# Patient Record
Sex: FEMALE | Race: BLACK OR AFRICAN AMERICAN | NOT HISPANIC OR LATINO | URBAN - METROPOLITAN AREA
[De-identification: names, ages, dates, MRNs, and addresses within clinical notes are randomized per-mention and may not be internally consistent; named-entity substitution may affect disease eponyms.]

---

## 2024-06-30 ENCOUNTER — HOSPITAL ENCOUNTER (EMERGENCY)
Age: 59
Discharge: LEFT BEFORE DISCHARGE | End: 2024-07-01

## 2024-06-30 VITALS
RESPIRATION RATE: 18 BRPM | HEART RATE: 76 BPM | DIASTOLIC BLOOD PRESSURE: 105 MMHG | OXYGEN SATURATION: 99 % | SYSTOLIC BLOOD PRESSURE: 186 MMHG | TEMPERATURE: 98 F

## 2024-06-30 ASSESSMENT — PAIN SCALES - GENERAL: PAINLEVEL_OUTOF10: 8

## 2024-10-21 ENCOUNTER — APPOINTMENT (OUTPATIENT)
Dept: CV DIAGNOSTICS | Facility: HOSPITAL | Age: 59
End: 2024-10-21
Attending: HOSPITALIST

## 2024-10-21 ENCOUNTER — APPOINTMENT (OUTPATIENT)
Dept: GENERAL RADIOLOGY | Facility: HOSPITAL | Age: 59
End: 2024-10-21
Attending: EMERGENCY MEDICINE

## 2024-10-21 ENCOUNTER — HOSPITAL ENCOUNTER (OUTPATIENT)
Facility: HOSPITAL | Age: 59
Setting detail: OBSERVATION
Discharge: HOME | End: 2024-10-23
Attending: EMERGENCY MEDICINE | Admitting: HOSPITALIST

## 2024-10-21 DIAGNOSIS — F41.9 ANXIETY: ICD-10-CM

## 2024-10-21 DIAGNOSIS — R07.9 CHEST PAIN, RULE OUT ACUTE MYOCARDIAL INFARCTION: Primary | ICD-10-CM

## 2024-10-21 PROBLEM — E78.00 PURE HYPERCHOLESTEROLEMIA: Chronic | Status: ACTIVE | Noted: 2024-10-21

## 2024-10-21 PROBLEM — I50.33 ACUTE ON CHRONIC HEART FAILURE WITH PRESERVED EJECTION FRACTION (HCC): Status: ACTIVE | Noted: 2024-10-21

## 2024-10-21 PROBLEM — I10 PRIMARY HYPERTENSION: Chronic | Status: ACTIVE | Noted: 2024-10-21

## 2024-10-21 PROBLEM — N18.6 ESRD ON DIALYSIS (HCC): Chronic | Status: ACTIVE | Noted: 2024-10-21

## 2024-10-21 PROBLEM — J81.0 ACUTE PULMONARY EDEMA (HCC): Status: ACTIVE | Noted: 2024-10-21

## 2024-10-21 PROBLEM — Z99.2 ESRD ON DIALYSIS (HCC): Chronic | Status: ACTIVE | Noted: 2024-10-21

## 2024-10-21 PROBLEM — N18.6 ESRD (END STAGE RENAL DISEASE) (HCC): Status: ACTIVE | Noted: 2024-10-21

## 2024-10-21 LAB
ALBUMIN SERPL-MCNC: 4 G/DL (ref 3.2–4.8)
ALBUMIN/GLOB SERPL: 1.1 {RATIO} (ref 1–2)
ALP LIVER SERPL-CCNC: 183 U/L
ALT SERPL-CCNC: <7 U/L
ANION GAP SERPL CALC-SCNC: 7 MMOL/L (ref 0–18)
AST SERPL-CCNC: 24 U/L (ref ?–34)
ATRIAL RATE: 90 BPM
BASOPHILS # BLD AUTO: 0.02 X10(3) UL (ref 0–0.2)
BASOPHILS NFR BLD AUTO: 0.4 %
BILIRUB SERPL-MCNC: 0.2 MG/DL (ref 0.3–1.2)
BUN BLD-MCNC: 27 MG/DL (ref 9–23)
CALCIUM BLD-MCNC: 10.2 MG/DL (ref 8.7–10.4)
CHLORIDE SERPL-SCNC: 98 MMOL/L (ref 98–112)
CHOLEST SERPL-MCNC: 153 MG/DL (ref ?–200)
CO2 SERPL-SCNC: 31 MMOL/L (ref 21–32)
CREAT BLD-MCNC: 4.56 MG/DL
EGFRCR SERPLBLD CKD-EPI 2021: 11 ML/MIN/1.73M2 (ref 60–?)
EOSINOPHIL # BLD AUTO: 0.14 X10(3) UL (ref 0–0.7)
EOSINOPHIL NFR BLD AUTO: 2.5 %
ERYTHROCYTE [DISTWIDTH] IN BLOOD BY AUTOMATED COUNT: 16.3 %
GLOBULIN PLAS-MCNC: 3.6 G/DL (ref 2–3.5)
GLUCOSE BLD-MCNC: 159 MG/DL (ref 70–99)
HBV SURFACE AB SER QL: REACTIVE
HBV SURFACE AB SERPL IA-ACNC: >1000 MIU/ML
HBV SURFACE AG SER-ACNC: <0.1 [IU]/L
HBV SURFACE AG SERPL QL IA: NONREACTIVE
HCT VFR BLD AUTO: 33 %
HDLC SERPL-MCNC: 48 MG/DL (ref 40–59)
HGB BLD-MCNC: 10.4 G/DL
IMM GRANULOCYTES # BLD AUTO: 0.03 X10(3) UL (ref 0–1)
IMM GRANULOCYTES NFR BLD: 0.5 %
LDLC SERPL CALC-MCNC: 90 MG/DL (ref ?–100)
LYMPHOCYTES # BLD AUTO: 0.64 X10(3) UL (ref 1–4)
LYMPHOCYTES NFR BLD AUTO: 11.3 %
MCH RBC QN AUTO: 28.1 PG (ref 26–34)
MCHC RBC AUTO-ENTMCNC: 31.5 G/DL (ref 31–37)
MCV RBC AUTO: 89.2 FL
MONOCYTES # BLD AUTO: 0.5 X10(3) UL (ref 0.1–1)
MONOCYTES NFR BLD AUTO: 8.9 %
NEUTROPHILS # BLD AUTO: 4.31 X10 (3) UL (ref 1.5–7.7)
NEUTROPHILS # BLD AUTO: 4.31 X10(3) UL (ref 1.5–7.7)
NEUTROPHILS NFR BLD AUTO: 76.4 %
NONHDLC SERPL-MCNC: 105 MG/DL (ref ?–130)
OSMOLALITY SERPL CALC.SUM OF ELEC: 290 MOSM/KG (ref 275–295)
P AXIS: 23 DEGREES
P-R INTERVAL: 174 MS
PLATELET # BLD AUTO: 226 10(3)UL (ref 150–450)
POTASSIUM SERPL-SCNC: 3.8 MMOL/L (ref 3.5–5.1)
PROT SERPL-MCNC: 7.6 G/DL (ref 5.7–8.2)
Q-T INTERVAL: 402 MS
QRS DURATION: 70 MS
QTC CALCULATION (BEZET): 491 MS
R AXIS: 15 DEGREES
RBC # BLD AUTO: 3.7 X10(6)UL
SODIUM SERPL-SCNC: 136 MMOL/L (ref 136–145)
T AXIS: 28 DEGREES
TRIGL SERPL-MCNC: 75 MG/DL (ref 30–149)
TROPONIN I SERPL HS-MCNC: 32 NG/L
TROPONIN I SERPL HS-MCNC: 41 NG/L
TROPONIN I SERPL HS-MCNC: 42 NG/L
TROPONIN I SERPL HS-MCNC: 43 NG/L
VENTRICULAR RATE: 90 BPM
VLDLC SERPL CALC-MCNC: 12 MG/DL (ref 0–30)
WBC # BLD AUTO: 5.6 X10(3) UL (ref 4–11)

## 2024-10-21 PROCEDURE — 71045 X-RAY EXAM CHEST 1 VIEW: CPT | Performed by: EMERGENCY MEDICINE

## 2024-10-21 PROCEDURE — 99223 1ST HOSP IP/OBS HIGH 75: CPT | Performed by: HOSPITALIST

## 2024-10-21 PROCEDURE — 99223 1ST HOSP IP/OBS HIGH 75: CPT | Performed by: INTERNAL MEDICINE

## 2024-10-21 PROCEDURE — 93306 TTE W/DOPPLER COMPLETE: CPT | Performed by: HOSPITALIST

## 2024-10-21 RX ORDER — SEVELAMER CARBONATE 800 MG/1
800 TABLET, FILM COATED ORAL
COMMUNITY

## 2024-10-21 RX ORDER — DOCUSATE SODIUM 100 MG/1
100 CAPSULE, LIQUID FILLED ORAL 2 TIMES DAILY PRN
COMMUNITY

## 2024-10-21 RX ORDER — POLYETHYLENE GLYCOL 3350 17 G/17G
17 POWDER, FOR SOLUTION ORAL DAILY PRN
Status: DISCONTINUED | OUTPATIENT
Start: 2024-10-21 | End: 2024-10-23

## 2024-10-21 RX ORDER — HYDRALAZINE HYDROCHLORIDE 50 MG/1
50 TABLET, FILM COATED ORAL DAILY
Status: ON HOLD | COMMUNITY
End: 2024-10-21

## 2024-10-21 RX ORDER — ONDANSETRON 2 MG/ML
4 INJECTION INTRAMUSCULAR; INTRAVENOUS EVERY 6 HOURS PRN
Status: DISCONTINUED | OUTPATIENT
Start: 2024-10-21 | End: 2024-10-23

## 2024-10-21 RX ORDER — NIFEDIPINE 30 MG/1
60 TABLET, EXTENDED RELEASE ORAL 2 TIMES DAILY
Status: DISCONTINUED | OUTPATIENT
Start: 2024-10-21 | End: 2024-10-23

## 2024-10-21 RX ORDER — HYDRALAZINE HYDROCHLORIDE 50 MG/1
50 TABLET, FILM COATED ORAL DAILY
Status: DISCONTINUED | OUTPATIENT
Start: 2024-10-21 | End: 2024-10-21

## 2024-10-21 RX ORDER — SODIUM PHOSPHATE, DIBASIC AND SODIUM PHOSPHATE, MONOBASIC 7; 19 G/230ML; G/230ML
1 ENEMA RECTAL ONCE AS NEEDED
Status: DISCONTINUED | OUTPATIENT
Start: 2024-10-21 | End: 2024-10-23

## 2024-10-21 RX ORDER — METHOCARBAMOL 750 MG/1
750 TABLET, FILM COATED ORAL 3 TIMES DAILY
COMMUNITY

## 2024-10-21 RX ORDER — LOSARTAN POTASSIUM 50 MG/1
50 TABLET ORAL DAILY
Status: DISCONTINUED | OUTPATIENT
Start: 2024-10-21 | End: 2024-10-23

## 2024-10-21 RX ORDER — METHOCARBAMOL 750 MG/1
750 TABLET, FILM COATED ORAL 3 TIMES DAILY
Status: DISCONTINUED | OUTPATIENT
Start: 2024-10-21 | End: 2024-10-23

## 2024-10-21 RX ORDER — CARVEDILOL 25 MG/1
25 TABLET ORAL 2 TIMES DAILY WITH MEALS
COMMUNITY

## 2024-10-21 RX ORDER — PANTOPRAZOLE SODIUM 40 MG/1
40 TABLET, DELAYED RELEASE ORAL
COMMUNITY

## 2024-10-21 RX ORDER — PROCHLORPERAZINE EDISYLATE 5 MG/ML
5 INJECTION INTRAMUSCULAR; INTRAVENOUS EVERY 8 HOURS PRN
Status: DISCONTINUED | OUTPATIENT
Start: 2024-10-21 | End: 2024-10-23

## 2024-10-21 RX ORDER — FOLIC ACID 1 MG/1
1 TABLET ORAL DAILY
COMMUNITY

## 2024-10-21 RX ORDER — SENNOSIDES 8.6 MG
17.2 TABLET ORAL NIGHTLY PRN
Status: DISCONTINUED | OUTPATIENT
Start: 2024-10-21 | End: 2024-10-23

## 2024-10-21 RX ORDER — BISACODYL 10 MG
10 SUPPOSITORY, RECTAL RECTAL
Status: DISCONTINUED | OUTPATIENT
Start: 2024-10-21 | End: 2024-10-23

## 2024-10-21 RX ORDER — NITROGLYCERIN 0.4 MG/1
0.4 TABLET SUBLINGUAL ONCE
Status: COMPLETED | OUTPATIENT
Start: 2024-10-21 | End: 2024-10-21

## 2024-10-21 RX ORDER — ATORVASTATIN CALCIUM 40 MG/1
40 TABLET, FILM COATED ORAL NIGHTLY
Status: ON HOLD | COMMUNITY
End: 2024-10-22

## 2024-10-21 RX ORDER — CARVEDILOL 12.5 MG/1
25 TABLET ORAL 2 TIMES DAILY WITH MEALS
Status: DISCONTINUED | OUTPATIENT
Start: 2024-10-21 | End: 2024-10-23

## 2024-10-21 RX ORDER — CINACALCET 30 MG/1
30 TABLET, FILM COATED ORAL
Status: DISCONTINUED | OUTPATIENT
Start: 2024-10-21 | End: 2024-10-23

## 2024-10-21 RX ORDER — ASPIRIN 81 MG/1
81 TABLET ORAL DAILY
Status: DISCONTINUED | OUTPATIENT
Start: 2024-10-21 | End: 2024-10-23

## 2024-10-21 RX ORDER — ENOXAPARIN SODIUM 100 MG/ML
40 INJECTION SUBCUTANEOUS DAILY
Status: DISCONTINUED | OUTPATIENT
Start: 2024-10-21 | End: 2024-10-21

## 2024-10-21 RX ORDER — ACETAMINOPHEN 500 MG
500 TABLET ORAL EVERY 4 HOURS PRN
Status: DISCONTINUED | OUTPATIENT
Start: 2024-10-21 | End: 2024-10-23

## 2024-10-21 RX ORDER — VITAMIN B COMPLEX
CAPSULE ORAL
COMMUNITY

## 2024-10-21 RX ORDER — FUROSEMIDE 10 MG/ML
40 INJECTION INTRAMUSCULAR; INTRAVENOUS ONCE
Status: COMPLETED | OUTPATIENT
Start: 2024-10-21 | End: 2024-10-21

## 2024-10-21 RX ORDER — PANTOPRAZOLE SODIUM 40 MG/1
40 TABLET, DELAYED RELEASE ORAL
Status: DISCONTINUED | OUTPATIENT
Start: 2024-10-21 | End: 2024-10-23

## 2024-10-21 RX ORDER — ATORVASTATIN CALCIUM 40 MG/1
40 TABLET, FILM COATED ORAL NIGHTLY
Status: DISCONTINUED | OUTPATIENT
Start: 2024-10-21 | End: 2024-10-23

## 2024-10-21 RX ORDER — MELATONIN
3 NIGHTLY PRN
Status: DISCONTINUED | OUTPATIENT
Start: 2024-10-21 | End: 2024-10-23

## 2024-10-21 RX ORDER — ASPIRIN 81 MG/1
81 TABLET ORAL DAILY
COMMUNITY

## 2024-10-21 RX ORDER — HEPARIN SODIUM 5000 [USP'U]/ML
5000 INJECTION, SOLUTION INTRAVENOUS; SUBCUTANEOUS EVERY 8 HOURS SCHEDULED
Status: DISCONTINUED | OUTPATIENT
Start: 2024-10-21 | End: 2024-10-23

## 2024-10-21 RX ORDER — SEVELAMER CARBONATE 800 MG/1
1600 TABLET, FILM COATED ORAL
Status: DISCONTINUED | OUTPATIENT
Start: 2024-10-21 | End: 2024-10-23

## 2024-10-21 RX ORDER — ALPRAZOLAM 0.25 MG/1
0.25 TABLET ORAL ONCE
Status: COMPLETED | OUTPATIENT
Start: 2024-10-21 | End: 2024-10-21

## 2024-10-21 RX ORDER — SENNOSIDES A AND B 8.6 MG/1
2 TABLET, FILM COATED ORAL AS NEEDED
COMMUNITY

## 2024-10-21 RX ORDER — ALPRAZOLAM 0.25 MG/1
0.25 TABLET ORAL 3 TIMES DAILY PRN
Status: DISCONTINUED | OUTPATIENT
Start: 2024-10-21 | End: 2024-10-23

## 2024-10-21 RX ORDER — LOSARTAN POTASSIUM 50 MG/1
50 TABLET ORAL DAILY
COMMUNITY

## 2024-10-21 RX ORDER — CINACALCET 30 MG/1
30 TABLET, FILM COATED ORAL
COMMUNITY

## 2024-10-21 RX ORDER — ZINC OXIDE 20 %
OINTMENT (GRAM) TOPICAL AS NEEDED
COMMUNITY

## 2024-10-21 NOTE — DISCHARGE INSTRUCTIONS
General Medical Follow up:  [facility] (healthcare clinic) [email or web address]  [facility] welcomes patients with Medicaid, Medicare, private insurance or no insurance at all, possibly at a discounted rate. [facility] offers low cost prescriptions drugs when seen by a [facility] physician.   [facility] [address], [place] [id number] [phone]  [facility] [address], [place] [address] [phone]  [facility] [address] [place], [place] [id number] [phone]  [facility] [address], [address] [place] [address]  Ela colby kimberlee aqui o llame al teléfono [phone] o al [phone].     [facility] provides care for chronic disease management, and offers coordinated, patient-centered care.  Their services include adult health, pediatric health, women's health, dental services, behavioral health services and LGBTQ+ health.  [facility] works to eliminate any barriers that may keep our patients from accessing services. We do this by providing 24-hour access to providers, comprehensive care management, transportation assistance, access to reduced-price pharmaceuticals, on-site laboratories, same and next-day appointments, bilingual staff and translation services.    [facility]- [address]- [address], [address], [facility]- [address] [facility] [facility]- [address] [facility], [address], [facility]- [address] [facility]- [address] [facility]- [address], [facility]- [address], [facility] [address], [place], [place]     [email or web address]  Main number [phone]    Visit [employer] for discounted  prescription drug coupons or [employer] for $4 prescriptions [email or web address]    To inquire about [place], call [facility] [phone] or visit [email or web address]    Healthcare.gov - Marketplace insurance      Community Health Care Program (only apply if you do not Qualify for medicaid)  [facility]  [address]; [address]  [place]  Phone: [phone]  [email or web address]    [place] [address] [address]  [phone]  [email or web address]    Questions about financial assistance, application, or uninsured discounts can be directed to [phone]      Going Home    In this section you will find the tools which will guide you through the first few days after you leave the hospital. Continued use of these tools will help you develop the skills necessary to keep your heart failure under control.     Heart Failure Guidelines - place this worksheet on your refrigerator or somewhere you can refer to it daily to help you decide if your symptoms are under control, and what to do if they are not.     Home Care Instructions Following Heart Failure - the most important things to do every day include:     Weigh yourself  Take your medicines as prescribed  Limit your sodium (salt) and fluid intake  Know when to call your cardiologist, primary doctor, or nurse  Know when to seek emergency care    There is also a handy weight chart on which to record your weight every day, information on measuring fluids and limiting fluid intake, and a section for recording your thoughts or questions.     Things for You to Remember:   1. An appointment has been made for you to see your doctor or healthcare provider within 7 days of hospital discharge. It is important that you attend this appointment to make sure your symptoms are under control.     2. Your recommended sodium intake is 3909-5281 mg  daily    3. Limit your fluid intake to no more than 2 liters or 64 ounces per day    4. Some exercise and activity is important to help keep your heart functioning and strong. Unless instructed not to exercise, you may walk at a slow to moderate pace for 10-15 minutes 2-3 days per week to start. Pace your activity to prevent shortness of breath or fatigue. Stop exercise if you develop chest pain, lightheadedness, or significant shortness of breath.       Call Your Cardiologist If:   You gain 2 pounds overnight or 3-4 pounds in 3-5 days  You have more difficulty breathing  You are getting more tired with normal activity  You are more short of breath lying down, or awaken at night short of breath  You have swelling of your feet or legs  You urinate less often during the day and more often at night  You have cramps in your legs  You have blurred vision or see yellowish-green halos around objects of lights    Go to the Emergency Room If:   You have pain or tightness in your chest  You are extremely short of breath  You are coughing up pink-frothy mucus  You are traveling and develop symptoms of worsening heart failure    Additional Resources:   If you have questions or concerns about heart failure management, call the [facility] Heart Failure Unit at [phone]

## 2024-10-21 NOTE — CM/SW NOTE
Pt listed as self pay. Resources on AVS for self pay. GLM also made aware to screen pt. Pt goes to outpatient HD at  [facility]. Msg left w/ facility to see if they have ins info on pt.    Lizbeth FREEMAN, LSW  Discharge Planner

## 2024-10-21 NOTE — CM/SW NOTE
Spoke with [facility]. Stated that they have [facility]. Policy ID # [id number].  updated GLM.     Lizbeth FREEMAN, LSW  Discharge Planner

## 2024-10-21 NOTE — H&P
[facility]     Merlin George Patient Status:  Emergency    10/27/1965 MRN [id number]   Location [facility] EMERGENCY DEPARTMENT Attending Jesse Santos MD   Hosp Day # 0 PCP No primary care provider on file.     Chief Complaint: Chest pain    Subjective:    History of Present Illness:     Merlin George is a 58 year old female with hx of ESRD on HD T, , Sat had dialysis this past Saturday and after the dialysis started having some chest pain they felt she was having of panic attack gave her some Benadryl she says the Benadryl did not help she continues to have chest pain and shortness of breath.  Patient does states she feels anxious.  No nausea, vomiting.    History/Other:    Past Medical History:  Past Medical History:    CKD (chronic kidney disease)    Diabetes (HCC)    Dialysis patient (HCC)    Essential hypertension    Hyperlipidemia     Past Surgical History:   Past Surgical History:   Procedure Laterality Date    Excis lumbar disk,one level      Removal gallbladder        Family History:   History reviewed. No pertinent family history.  Social History:         Allergies: Allergies[1]    Medications:  Medications Ordered Prior to Encounter[2]    Review of Systems:   A comprehensive review of systems was completed.    Pertinent positives and negatives noted in the HPI.    Objective:   Physical Exam:    /78   Pulse 87   Temp 99.1 °F (37.3 °C) (Temporal)   Resp 18   Ht 5' 3\" (1.6 m)   Wt 120 lb (54.4 kg)   SpO2 100%   BMI 21.26 kg/m²   General: No acute distress, Alert  Respiratory: No rhonchi, no wheezes  Cardiovascular: S1, S2. Regular rate and rhythm  Abdomen: Soft, Non-tender, non-distended, positive bowel sounds  Neuro: No new focal deficits  Extremities: No edema      Results:    Labs:      Labs Last 24 Hours:    Recent Labs   Lab 10/21/24  0330   RBC 3.70*   HGB 10.4*   HCT 33.0*   MCV 89.2   MCH 28.1   MCHC 31.5   RDW 16.3   NEPRELIM 4.31   WBC 5.6    .0       No results for input(s): \"GLU\", \"BUN\", \"CREATSERUM\", \"GFRAA\", \"GFRNAA\", \"EGFRCR\", \"CA\", \"ALB\", \"NA\", \"K\", \"CL\", \"CO2\", \"ALKPHO\", \"AST\", \"ALT\", \"BILT\", \"TP\" in the last 168 hours.    No results found for: \"PT\", \"INR\"    No results for input(s): \"TROP\", \"TROPHS\", \"CK\" in the last 168 hours.    No results for input(s): \"TROP\", \"PBNP\" in the last 168 hours.    No results for input(s): \"PCT\" in the last 168 hours.    Imaging: Imaging data reviewed in Epic.    Assessment & Plan:      #Chest pain  - Will rule out ACS with serial troponins  - Echo in the am  - Cardiology on consult    # ESRD on HD  - Will consult nephrology for dialysis    # Hypertension  - Will continue on coreg, hydralazine, nifedepine and losartan.    # HFpEF  - Echo in 2023 show EF of 55% with grade 1 diastolic dysfunction.    # Hyperlipidemia  - Will continue on statin therapy.    # GERD  - Will continue on PPI.      Plan of care discussed with patient at bedside.    Khari Lyle, DO    Supplementary Documentation:     The 21st Century Cures Act makes medical notes like these available to patients in the interest of transparency. Please be advised this is a medical document. Medical documents are intended to carry relevant information, facts as evident, and the clinical opinion of the practitioner. The medical note is intended as peer to peer communication and may appear blunt or direct. It is written in medical language and may contain abbreviations or verbiage that are unfamiliar.                                     [1]   Allergies  Allergen Reactions    Codeine HALLUCINATION    Dilaudid [Hydromorphone] ANXIETY    Tramadol HALLUCINATION    Ventolin [Albuterol] ANXIETY   [2]   No current facility-administered medications on file prior to encounter.     Current Outpatient Medications on File Prior to Encounter   Medication Sig Dispense Refill    carvedilol 25 MG Oral Tab Take 1 tablet (25 mg total) by mouth 2 (two) times  daily with meals.      cinacalcet 30 MG Oral Tab Take 1 tablet (30 mg total) by mouth daily with breakfast.      NIFEdipine ER 60 MG Oral Tablet 24 Hr Take 1 tablet (60 mg total) by mouth in the morning and 1 tablet (60 mg total) before bedtime.      hydrALAZINE 50 MG Oral Tab Take 1 tablet (50 mg total) by mouth daily.      Methylnaltrexone Bromide 150 MG Oral Tab Take 150 mg by mouth daily.      pantoprazole 40 MG Oral Tab EC Take 1 tablet (40 mg total) by mouth every morning before breakfast.      linaCLOtide 145 MCG Oral Cap Take 145 mcg by mouth daily.      methocarbamol 750 MG Oral Tab Take 1 tablet (750 mg total) by mouth 3 (three) times daily.      sennosides 8.6 MG Oral Tab Take 2 tablets (17.2 mg total) by mouth as needed for constipation.      ceFEPIme 100 mg/mL in sodium chloride 10 mL (1 g total) by IV Push route one time. During Dialyis, T, TH, Saturday      folic acid 1 MG Oral Tab Take 1 tablet (1 mg total) by mouth daily.      aspirin 81 MG Oral Tab EC Take 1 tablet (81 mg total) by mouth daily.      sevelamer carbonate (RENVELA) 800 MG Oral Tab Take 1 tablet (800 mg total) by mouth 3 (three) times daily with meals. 2 tabs      atorvastatin 40 MG Oral Tab Take 1 tablet (40 mg total) by mouth nightly.      losartan 50 MG Oral Tab Take 1 tablet (50 mg total) by mouth daily.      docusate sodium 100 MG Oral Cap Take 1 capsule (100 mg total) by mouth 2 (two) times daily as needed for constipation.      zinc oxide 20 % External Ointment Apply topically as needed for Dry Skin.      B Complex Vitamins (VITAMIN B COMPLEX) Oral Cap Take by mouth.

## 2024-10-21 NOTE — PROGRESS NOTES
10/21/24 0525 10/21/24 0530   Vital Signs   Temp 99 °F (37.2 °C)  --    Temp src Axillary  --    Pulse 93 96   Heart Rate Source Monitor Monitor   Cardiac Rhythm NSR NSR   Resp 19 19   Respiratory Quality Normal Normal   /76 148/78   MAP (mmHg) 97 100   BP Location Right arm Right arm   BP Method Automatic Automatic   Patient Position Lying Sitting   Oxygen Therapy   SpO2 94 % 96 %   O2 Device Nasal cannula Nasal cannula   O2 Flow Rate (L/min) 3 L/min 3 L/min   Pulse Oximetry Type Continuous Continuous   Oximetry Probe Site Changed No No

## 2024-10-21 NOTE — PLAN OF CARE
Assumed care of patient around 0730.Patient AXOX4.On 3L /NC.NSR on tele.Patient c/o shortness of breath,patient stated she had HD on Saturday  and was able to pull out only 1L due to panic attack.Notified primary,nephrology consult ordered,stat HD ordered.Xanax given.Call light within reach.Plan of care updated.  Problem: Diabetes/Glucose Control  Goal: Glucose maintained within prescribed range  Description: INTERVENTIONS:  - Monitor Blood Glucose as ordered  - Assess for signs and symptoms of hyperglycemia and hypoglycemia  - Administer ordered medications to maintain glucose within target range  - Assess barriers to adequate nutritional intake and initiate nutrition consult as needed  - Instruct patient on self management of diabetes  10/21/2024 1207 by Nicanor De La Fuente RN  Outcome: Progressing  10/21/2024 1207 by Nicanor De La Fuente RN  Outcome: Progressing     Problem: Patient/Family Goals  Goal: Patient/Family Long Term Goal  Description: Patient's Long Term Goal: stay healthy    Interventions:  - HD  -Medication management  -Dietary management  -  - See additional Care Plan goals for specific interventions  10/21/2024 1207 by Nicanor De La Fuente RN  Outcome: Progressing  10/21/2024 1207 by Nicanor De La Fuente, MEGHANA  Outcome: Progressing  Goal: Patient/Family Short Term Goal  Description: Patient's Short Term Goal: discharge home    Interventions:   - HD today  -Anti anxiety medication  -Emotional support  -Wean O2  - See additional Care Plan goals for specific interventions  10/21/2024 1207 by Nicanor De La Fuente, MEGHANA  Outcome: Progressing  10/21/2024 1207 by Nicanor De La Fuente, MEGHANA  Outcome: Progressing     Problem: PAIN - ADULT  Goal: Verbalizes/displays adequate comfort level or patient's stated pain goal  Description: INTERVENTIONS:  - Encourage pt to monitor pain and request assistance  - Assess pain using appropriate pain scale  - Administer analgesics based on type and severity of pain and evaluate response  - Implement non-pharmacological  measures as appropriate and evaluate response  - Consider cultural and social influences on pain and pain management  - Manage/alleviate anxiety  - Utilize distraction and/or relaxation techniques  - Monitor for opioid side effects  - Notify MD/LIP if interventions unsuccessful or patient reports new pain  - Anticipate increased pain with activity and pre-medicate as appropriate  10/21/2024 1207 by Nicanor De La Fuente, RN  Outcome: Progressing  10/21/2024 1207 by Nicanor De La Fuente, RN  Outcome: Progressing     Problem: RISK FOR INFECTION - ADULT  Goal: Absence of fever/infection during anticipated neutropenic period  Description: INTERVENTIONS  - Monitor WBC  - Administer growth factors as ordered  - Implement neutropenic guidelines  10/21/2024 1207 by Nicanor De La Fuente, RN  Outcome: Progressing  10/21/2024 1207 by Nicanor De La Fuente, RN  Outcome: Progressing

## 2024-10-21 NOTE — ED PROVIDER NOTES
Patient Seen in: [facility] Emergency Department      History     Chief Complaint   Patient presents with   • Chest Pain Angina     Stated Complaint: chest pain    Subjective:   HPI      58-year-old female presenting with chest pain EMS were called because patient was awakened with retrosternal chest pain and some shortness of breath prompting visit here aspirin nitroglycerin were given patient symptoms have since improved patient denies any sort of chest pain recent cough cold fevers chills or any other exacerbating leaving factors or associated symptoms.  She is a dialysis patient was dialysis patient receives dialysis Tuesday Thursday and Saturday.  No recent cough cold no recent salty meals    Objective:     Past Medical History:   • CKD (chronic kidney disease)   • Diabetes (HCC)   • Dialysis patient (HCC)   • Essential hypertension   • Hyperlipidemia              Past Surgical History:   Procedure Laterality Date   • Excis lumbar disk,one level     • Removal gallbladder                  No pertinent social history.                Physical Exam     ED Triage Vitals [10/21/24 0325]   /78   Pulse 87   Resp 18   Temp 99.1 °F (37.3 °C)   Temp src Temporal   SpO2 100 %   O2 Device None (Room air)       Current Vitals:   Vital Signs  BP: 133/78  Pulse: 87  Resp: 18  Temp: 99.1 °F (37.3 °C)  Temp src: Temporal  MAP (mmHg): 93    Oxygen Therapy  SpO2: 100 %  O2 Device: None (Room air)        Physical Exam  Awake alert patient appears no distress HEENT exam is normal lungs are clear cardiovascular exam regular rhythm abdomen soft nontender extremities no Cyanosis or edema    ED Course     Labs Reviewed   COMP METABOLIC PANEL (14) - Abnormal; Notable for the following components:       Result Value    Glucose 159 (*)     BUN 27 (*)     Creatinine 4.56 (*)     eGFR-Cr 11 (*)     ALT <7 (*)     Alkaline Phosphatase 183 (*)     Bilirubin, Total 0.2 (*)     Globulin  3.6 (*)     All other components within  normal limits   CBC WITH DIFFERENTIAL WITH PLATELET - Abnormal; Notable for the following components:    RBC 3.70 (*)     HGB 10.4 (*)     HCT 33.0 (*)     Lymphocyte Absolute 0.64 (*)     All other components within normal limits   TROPONIN I HIGH SENSITIVITY - Abnormal; Notable for the following components:    Troponin I (High Sensitivity) 42 (*)     All other components within normal limits   LIPID PANEL - Normal   RAINBOW DRAW LAVENDER   RAINBOW DRAW LIGHT GREEN   RAINBOW DRAW BLUE     EKG    Rate, intervals and axes as noted on EKG Report.  Rate: 90  Rhythm: Sinus Rhythm  Reading: Prolonged QT noted                Differential diagnosis includes STEMI, NSTEMI, ACS       MDM          Admission disposition: 10/21/2024  4:47 AM           Medical Decision Making  58-year-old female presenting to the emergency department for chest pain IV established cardiac monitor shows a sinus rhythm pulse ox shows no signs of hypoxia.  CBC shows stable hemoglobin level metabolic panel is elevated renal function which is consistent with her renal disease troponin level shows a minimal elevation which could be due to her renal disease as well independent interpretation by ED physician chest x-ray shows pulmonary edema Lasix nitroglycerin ordered here emerged department symptoms have continued to improve she has no chest pain at this time she will be admitted to the hospitalist with cardiac consultation    Problems Addressed:  Chest pain, rule out acute myocardial infarction: acute illness or injury    Amount and/or Complexity of Data Reviewed  Labs: ordered. Decision-making details documented in ED Course.  Radiology: ordered and independent interpretation performed. Decision-making details documented in ED Course.  ECG/medicine tests: ordered and independent interpretation performed. Decision-making details documented in ED Course.    Risk  Decision regarding hospitalization.        Disposition and Plan     Clinical  Impression:  1. Chest pain, rule out acute myocardial infarction         Disposition:  Admit  10/21/2024  4:47 am    Follow-up:  No follow-up provider specified.        Medications Prescribed:  Current Discharge Medication List              Supplementary Documentation:         Hospital Problems       Present on Admission  Date Reviewed: 10/21/2024            ICD-10-CM Noted POA    * (Principal) Chest pain, rule out acute myocardial infarction R07.9 10/21/2024 Unknown

## 2024-10-21 NOTE — PLAN OF CARE
Patient alert and oriented x 3. On 3L o2 support. Sinus on cardiac monitor. Patient denies any chest pain or chest discomfort at this time.no urin output, creatinine elevated, hemo dialysis patient  ,TTS ,LT arm Fistula,last HD able to remove one litter only due to panic, patient  now very discomfort/ sob , need dialysis, last BM 10/20. . Plan of care updated, all questions answered. Safety precautions in place. Bed alarm on. Will continue to monitor tele/labs/vital signs closely.   Skin assessment done with staff adele, only sacrum excoriated old healing marks, skin care provided .  Plan:   ECHO , TROPONIN IN AM, DW     Problem: Diabetes/Glucose Control  Goal: Glucose maintained within prescribed range  Description: INTERVENTIONS:  - Monitor Blood Glucose as ordered  - Assess for signs and symptoms of hyperglycemia and hypoglycemia  - Administer ordered medications to maintain glucose within target range  - Assess barriers to adequate nutritional intake and initiate nutrition consult as needed  - Instruct patient on self management of diabetes  Outcome: Progressing     Problem: PAIN - ADULT  Goal: Verbalizes/displays adequate comfort level or patient's stated pain goal  Description: INTERVENTIONS:  - Encourage pt to monitor pain and request assistance  - Assess pain using appropriate pain scale  - Administer analgesics based on type and severity of pain and evaluate response  - Implement non-pharmacological measures as appropriate and evaluate response  - Consider cultural and social influences on pain and pain management  - Manage/alleviate anxiety  - Utilize distraction and/or relaxation techniques  - Monitor for opioid side effects  - Notify MD/LIP if interventions unsuccessful or patient reports new pain  - Anticipate increased pain with activity and pre-medicate as appropriate  Outcome: Progressing     Problem: RISK FOR INFECTION - ADULT  Goal: Absence of fever/infection during anticipated neutropenic  period  Description: INTERVENTIONS  - Monitor WBC  - Administer growth factors as ordered  - Implement neutropenic guidelines  Outcome: Progressing     Problem: SAFETY ADULT - FALL  Goal: Free from fall injury  Description: INTERVENTIONS:  - Assess pt frequently for physical needs  - Identify cognitive and physical deficits and behaviors that affect risk of falls.  - [place] fall precautions as indicated by assessment.  - Educate pt/family on patient safety including physical limitations  - Instruct pt to call for assistance with activity based on assessment  - Modify environment to reduce risk of injury  - Provide assistive devices as appropriate  - Consider OT/PT consult to assist with strengthening/mobility  - Encourage toileting schedule  Outcome: Progressing     Problem: DISCHARGE PLANNING  Goal: Discharge to home or other facility with appropriate resources  Description: INTERVENTIONS:  - Identify barriers to discharge w/pt and caregiver  - Include patient/family/discharge partner in discharge planning  - Arrange for needed discharge resources and transportation as appropriate  - Identify discharge learning needs (meds, wound care, etc)  - Arrange for interpreters to assist at discharge as needed  - Consider post-discharge preferences of patient/family/discharge partner  - Complete POLST form as appropriate  - Assess patient's ability to be responsible for managing their own health  - Refer to Case Management Department for coordinating discharge planning if the patient needs post-hospital services based on physician/LIP order or complex needs related to functional status, cognitive ability or social support system  Outcome: Progressing     Problem: SKIN/TISSUE INTEGRITY - ADULT  Goal: Skin integrity remains intact  Description: INTERVENTIONS  - Assess and document risk factors for pressure ulcer development  - Assess and document skin integrity  - Monitor for areas of redness and/or skin breakdown  -  Initiate interventions, skin care algorithm/standards of care as needed  Outcome: Progressing     Problem: METABOLIC/FLUID AND ELECTROLYTES - ADULT  Goal: Glucose maintained within prescribed range  Description: INTERVENTIONS:  - Monitor Blood Glucose as ordered  - Assess for signs and symptoms of hyperglycemia and hypoglycemia  - Administer ordered medications to maintain glucose within target range  - Assess barriers to adequate nutritional intake and initiate nutrition consult as needed  - Instruct patient on self management of diabetes  Outcome: Progressing     Problem: GASTROINTESTINAL - ADULT  Goal: Maintains or returns to baseline bowel function  Description: INTERVENTIONS:  - Assess bowel function  - Maintain adequate hydration with IV or PO as ordered and tolerated  - Evaluate effectiveness of GI medications  - Encourage mobilization and activity  - Obtain nutritional consult as needed  - Establish a toileting routine/schedule  - Consider collaborating with pharmacy to review patient's medication profile  Outcome: Progressing     Problem: GASTROINTESTINAL - ADULT  Goal: Minimal or absence of nausea and vomiting  Description: INTERVENTIONS:  - Maintain adequate hydration with IV or PO as ordered and tolerated  - Nasogastric tube to low intermittent suction as ordered  - Evaluate effectiveness of ordered antiemetic medications  - Provide nonpharmacologic comfort measures as appropriate  - Advance diet as tolerated, if ordered  - Obtain nutritional consult as needed  - Evaluate fluid balance  Outcome: Progressing

## 2024-10-21 NOTE — CONSULTS
[facility]  Report of Consultation    Merlin George Patient Status:  Observation    10/27/1965 MRN [id number]   Location [facility] 8NE-A Attending Alberto Anglin MD   Hosp Day # 0 PCP None Pcp       Assessment / Plan:    1) ESRD- due to longstanding HTN / DM; on HD x 6 yrs at  [facility] T[facility]. Lytes OK but volume up with hypoxia, crackles, pulm edema. PLAN- HD today    2) Recent L paraspinal abscess s/p drainage  (Serr[place]ia -> cefepime x 8 weeks)    3) C4 burst fracture with cord compression- 2 stage surgery recommended by ortho but delayed due to #2    4) Thoracic disc herniation s/p T9-12 laminectomy, etc     5) DM 2    6) Anemia- due to CKD / above; continue EPO with HD    7) HTN- continue usual coreg / nifedipine / FERRER / losartan       Reason for Consultation:  ESRD    History of Present Illness:  Merlin George is a a(n) 58 year old female.  Very pleasant 58-year-old female with a history of end-stage renal disease on dialysis for about 6 years ago longstanding diabetes and hypertension last had dialysis on Saturday which was cut short because of a panic attack and anxiety now presents with progressive shortness of breath and hypoxia and found to have pulmonary edema.  Denies any fevers chills cough nausea vomiting diarrhea or other unusual symptoms.  Has been compliant with dialysis.  Normally does not use home O2.  Never smoked no history of COPD.    History:  Past Medical History:    CKD (chronic kidney disease)    Diabetes (HCC)    Dialysis patient (HCC)    Essential hypertension    Hyperlipidemia     Past Surgical History:   Procedure Laterality Date    Excis lumbar disk,one level      Removal gallbladder       History reviewed. No pertinent family history.  Denies family history of kidney disease.    reports that she has never smoked. She has never used smokeless tobacco.    Allergies:  Allergies[1]    Medications:    Current Facility-Administered Medications:      aspirin DR tab 81 mg, 81 mg, Oral, Daily    atorvastatin (Lipitor) tab 40 mg, 40 mg, Oral, Nightly    carvedilol (Coreg) tab 25 mg, 25 mg, Oral, BID with meals    cinacalcet (Sensipar) tab 30 mg, 30 mg, Oral, Daily with breakfast    hydrALAZINE (Apresoline) tab 50 mg, 50 mg, Oral, Daily    linaCLOtide CAPS 145 mcg, 145 mcg, Oral, Daily    losartan (Cozaar) tab 50 mg, 50 mg, Oral, Daily    methocarbamol (Robaxin) tab 750 mg, 750 mg, Oral, TID    NIFEdipine ER (Procardia-XL) 24 hr tab 60 mg, 60 mg, Oral, BID    pantoprazole (Protonix) DR tab 40 mg, 40 mg, Oral, QAM AC    sevelamer carbonate (Renvela) tab 1,600 mg, 1,600 mg, Oral, TID CC    melatonin tab 3 mg, 3 mg, Oral, Nightly PRN    acetaminophen (Tylenol Extra Strength) tab 500 mg, 500 mg, Oral, Q4H PRN    polyethylene glycol (PEG 3350) (Miralax) 17 g oral packet 17 g, 17 g, Oral, Daily PRN    sennosides (Senokot) tab 17.2 mg, 17.2 mg, Oral, Nightly PRN    bisacodyl (Dulcolax) 10 MG rectal suppository 10 mg, 10 mg, Rectal, Daily PRN    fleet enema (Fleet) rectal enema 133 mL, 1 enema, Rectal, Once PRN    [Held by provider] ondansetron (Zofran) 4 MG/2ML injection 4 mg, 4 mg, Intravenous, Q6H PRN    prochlorperazine (Compazine) 10 MG/2ML injection 5 mg, 5 mg, Intravenous, Q8H PRN    heparin (Porcine) 5000 UNIT/ML injection 5,000 Units, 5,000 Units, Subcutaneous, Q8H DAVID    ALPRAZolam (Xanax) tab 0.25 mg, 0.25 mg, Oral, Once    ALPRAZolam (Xanax) tab 0.25 mg, 0.25 mg, Oral, TID PRN  No current outpatient medications on file.       Review of Systems:  Please see HPI for pertinent positives. 10 point review of systems otherwise reviewed and negative.     Physical Exam:  /81 (BP Location: Right arm)   Pulse 106   Temp 98 °F (36.7 °C) (Oral)   Resp 20   Ht 5' 3\" (1.6 m)   Wt 116 lb 12.8 oz (53 kg)   SpO2 97%   BMI 20.69 kg/m²   Temp (24hrs), Av.7 °F (37.1 °C), Min:98 °F (36.7 °C), Max:99.1 °F (37.3 °C)     No intake or output data in the 24 hours ending  10/21/24 0934  Wt Readings from Last 3 Encounters:   10/21/24 116 lb 12.8 oz (53 kg)     General: awake alert  HEENT: No scleral icterus, MMM  Neck: Supple, no MICAH or thyromegaly  Cardiac: Regular rate and rhythm, S1, S2 normal, no murmur, rub, or gallop  Lungs: Decreased breath sounds at the bases bilaterally.   Abdomen: Soft, non-tender. + bowel sounds, no palpable organomegaly  Extremities: Without clubbing, cyanosis; no edema  Neurologic: Cranial nerves grossly intact, moving all extremities  Skin: Warm and dry, no rashes      Laboratory Data:  Lab Results   Component Value Date    WBC 5.6 10/21/2024    HGB 10.4 10/21/2024    HCT 33.0 10/21/2024    .0 10/21/2024    CREATSERUM 4.56 10/21/2024    BUN 27 10/21/2024     10/21/2024    K 3.8 10/21/2024    CL 98 10/21/2024    CO2 31.0 10/21/2024     10/21/2024    CA 10.2 10/21/2024    ALB 4.0 10/21/2024    ALKPHO 183 10/21/2024    BILT 0.2 10/21/2024    TP 7.6 10/21/2024    AST 24 10/21/2024    ALT <7 10/21/2024       Imaging:  All imaging studies reviewed.      Thank you for allowing me to participate in the care of your patient.    David Wright MD  10/21/2024  9:34 AM         [1]   Allergies  Allergen Reactions    Codeine HALLUCINATION    Dilaudid [Hydromorphone] ANXIETY    Tramadol HALLUCINATION    Ventolin [Albuterol] ANXIETY

## 2024-10-21 NOTE — CONSULTS
[facility] - CARDIOLOGY CONSULT NOTE    Merlin George Patient Status:  Observation    10/27/1965 MRN [id number]   Location [facility] 8NE-A Attending Alberto Anglin MD   Hosp Day # 0 PCP None Pcp     Date of Admission:  10/21/2024  Date of Consult:  10/21/2024  I was asked by Alberto Anglin MD to provide recommendations for evaluation and management of cardiac issues.    Reason for Consultation:     Chest pain, SOB    History of Present Illness:   Patient is a 58 year old female with history of ESRD on hemodialysis on , , , hypertension, hyperlipidemia, HFpEF, recent spine surgery complicated by hardware infection on course of antibiotics currently, who presents with chest pain and shortness of breath.  She was at dialysis on Saturday she started to feel like she was having a panic attack, her dialysis session was cut short and only took 1 L of fluid off.  She has had on and off chest pain, which is now resolved, however she feels like she cannot breathe and her throat is closing on itself.    Results:     Lab Results   Component Value Date    WBC 5.6 10/21/2024    HGB 10.4 (L) 10/21/2024    HCT 33.0 (L) 10/21/2024    .0 10/21/2024    CREATSERUM 4.56 (H) 10/21/2024    BUN 27 (H) 10/21/2024     10/21/2024    K 3.8 10/21/2024    CL 98 10/21/2024    CO2 31.0 10/21/2024     (H) 10/21/2024    CA 10.2 10/21/2024    ALB 4.0 10/21/2024    ALKPHO 183 (H) 10/21/2024    BILT 0.2 (L) 10/21/2024    TP 7.6 10/21/2024    AST 24 10/21/2024    ALT <7 (L) 10/21/2024       XR CHEST AP PORTABLE  (CPT=71045)    Result Date: 10/21/2024  CONCLUSION:  Cardiomegaly with pulmonary vascular congestion, increased interstitial markings in the lungs, and patchy ground-glass opacity is suggestive of congestive failure with pulmonary edema.  Clinical correlation recommended.   A preliminary report was provided by the [facility] teleradiology service.  LOCATION:  [facility]      Dictated by  (CST): Chapo Alexandra MD on 10/21/2024 at 7:07 AM     Finalized by (CST): Chapo Alexandra MD on 10/21/2024 at 7:07 AM      EKG 12 Lead    Result Date: 10/21/2024  Normal sinus rhythm Prolonged QT interval or tu fusion, consider myocardial disease, electrolyte imbalance, or drug effects Abnormal ECG No previous ECGs found in [facility] Confirmed by NEFTALI TAVERAS JORDAN ([id number]) on 10/21/2024 7:52:59 AM     Past Medical History  Past Medical History:    CKD (chronic kidney disease)    Diabetes (HCC)    Dialysis patient (HCC)    Essential hypertension    Hyperlipidemia       Past Surgical History  Past Surgical History:   Procedure Laterality Date    Excis lumbar disk,one level      Removal gallbladder         Family History  History reviewed. No pertinent family history.    Social History  Pediatric History   Patient Parents    Not on file     Other Topics Concern    Not on file   Social History Narrative    Not on file           Current Medications:  Current Facility-Administered Medications   Medication Dose Route Frequency    aspirin DR tab 81 mg  81 mg Oral Daily    atorvastatin (Lipitor) tab 40 mg  40 mg Oral Nightly    carvedilol (Coreg) tab 25 mg  25 mg Oral BID with meals    cinacalcet (Sensipar) tab 30 mg  30 mg Oral Daily with breakfast    hydrALAZINE (Apresoline) tab 50 mg  50 mg Oral Daily    linaCLOtide CAPS 145 mcg  145 mcg Oral Daily    losartan (Cozaar) tab 50 mg  50 mg Oral Daily    methocarbamol (Robaxin) tab 750 mg  750 mg Oral TID    NIFEdipine ER (Procardia-XL) 24 hr tab 60 mg  60 mg Oral BID    pantoprazole (Protonix) DR tab 40 mg  40 mg Oral QAM AC    sevelamer carbonate (Renvela) tab 1,600 mg  1,600 mg Oral TID CC    melatonin tab 3 mg  3 mg Oral Nightly PRN    acetaminophen (Tylenol Extra Strength) tab 500 mg  500 mg Oral Q4H PRN    polyethylene glycol (PEG 3350) (Miralax) 17 g oral packet 17 g  17 g Oral Daily PRN    sennosides (Senokot) tab 17.2 mg  17.2 mg Oral Nightly PRN    bisacodyl  (Dulcolax) 10 MG rectal suppository 10 mg  10 mg Rectal Daily PRN    fleet enema (Fleet) rectal enema 133 mL  1 enema Rectal Once PRN    ondansetron (Zofran) 4 MG/2ML injection 4 mg  4 mg Intravenous Q6H PRN    prochlorperazine (Compazine) 10 MG/2ML injection 5 mg  5 mg Intravenous Q8H PRN    heparin (Porcine) 5000 UNIT/ML injection 5,000 Units  5,000 Units Subcutaneous Q8H DAVID    ALPRAZolam (Xanax) tab 0.25 mg  0.25 mg Oral Once     Medications Prior to Admission   Medication Sig    carvedilol 25 MG Oral Tab Take 1 tablet (25 mg total) by mouth 2 (two) times daily with meals.    cinacalcet 30 MG Oral Tab Take 1 tablet (30 mg total) by mouth daily with breakfast.    NIFEdipine ER 60 MG Oral Tablet 24 Hr Take 1 tablet (60 mg total) by mouth in the morning and 1 tablet (60 mg total) before bedtime.    hydrALAZINE 50 MG Oral Tab Take 1 tablet (50 mg total) by mouth daily.    Methylnaltrexone Bromide 150 MG Oral Tab Take 150 mg by mouth daily.    pantoprazole 40 MG Oral Tab EC Take 1 tablet (40 mg total) by mouth every morning before breakfast.    linaCLOtide 145 MCG Oral Cap Take 145 mcg by mouth daily.    methocarbamol 750 MG Oral Tab Take 1 tablet (750 mg total) by mouth 3 (three) times daily.    sennosides 8.6 MG Oral Tab Take 2 tablets (17.2 mg total) by mouth as needed for constipation.    ceFEPIme 100 mg/mL in sodium chloride 10 mL (1 g total) by IV Push route one time. During Dialyis, T, TH, Saturday    folic acid 1 MG Oral Tab Take 1 tablet (1 mg total) by mouth daily.    aspirin 81 MG Oral Tab EC Take 1 tablet (81 mg total) by mouth daily.    sevelamer carbonate (RENVELA) 800 MG Oral Tab Take 1 tablet (800 mg total) by mouth 3 (three) times daily with meals. 2 tabs    atorvastatin 40 MG Oral Tab Take 1 tablet (40 mg total) by mouth nightly.    losartan 50 MG Oral Tab Take 1 tablet (50 mg total) by mouth daily.    docusate sodium 100 MG Oral Cap Take 1 capsule (100 mg total) by mouth 2 (two) times daily as  needed for constipation.    zinc oxide 20 % External Ointment Apply topically as needed for Dry Skin.    B Complex Vitamins (VITAMIN B COMPLEX) Oral Cap Take by mouth.       Allergies  Allergies[1]    Review of Systems:   10 pt ROS performed, separate from HPI  Review of Systems:  GENERAL: no fevers, chills, sweats  HEENT: no visual or hearing changes  SKIN: denies any unusual skin lesions or rashes  RESPIRATORY: denies shortness of breath with exertion  CARDIOVASCULAR: no active chest pain, no claudication  GI: denies abdominal pain and denies heartburn  : no dysuria or hematuria  NEURO: denies headaches, focal weaknesses or paresthesias  All other systems were reviewed are negative  Physical Exam:   Blood pressure 155/81, pulse 106, temperature 98 °F (36.7 °C), temperature source Oral, resp. rate 20, height 63\", weight 116 lb 12.8 oz (53 kg), SpO2 97%.    Scheduled Meds:    aspirin  81 mg Oral Daily    atorvastatin  40 mg Oral Nightly    carvedilol  25 mg Oral BID with meals    cinacalcet  30 mg Oral Daily with breakfast    hydrALAZINE  50 mg Oral Daily    linaCLOtide  145 mcg Oral Daily    losartan  50 mg Oral Daily    methocarbamol  750 mg Oral TID    NIFEdipine ER  60 mg Oral BID    pantoprazole  40 mg Oral QAM AC    sevelamer carbonate  1,600 mg Oral TID CC    heparin  5,000 Units Subcutaneous Q8H DAVID    ALPRAZolam  0.25 mg Oral Once         Physical Exam:    General: Alert and oriented. No apparent distress. No respiratory or constitutional distress.  HEENT: Normocephalic, anicteric sclera, neck supple  Neck: Positive JVD, carotids 2+, supple  Cardiac: Regular rate. No pathologic murmur.  Lungs: Crackles at the bases, coarse breath sounds throughout  Abdomen: Soft, non-tender. BS-present.  Extremities: Without clubbing, cyanosis.  Peripheral pulsespresent.  Neurologic: Alert and oriented, normal affect. Motor ok.  Skin: Warm and dry.     Imaging: I independently visualized all relevant chest imaging in  PACS, agree with radiology interpretation except where noted.  Thank you for allowing me to participate in the care of your patient.    Labs:  HEM:  Recent Labs   Lab 10/21/24  0330   WBC 5.6   HGB 10.4*   .0       Chem:  Recent Labs   Lab 10/21/24  0331      K 3.8   CL 98   CO2 31.0   BUN 27*   CREATSERUM 4.56*   CA 10.2   *       Recent Labs   Lab 10/21/24  0331   ALT <7*   AST 24   ALB 4.0       No results for input(s): \"TROP\", \"CK\" in the last 168 hours.    No results for input(s): \"PTP\", \"INR\" in the last 168 hours.    Impression:   1.  Shortness of breath-suspect related to volume overload, as she has JVD and pulmonary vascular congestion on her x-ray, and was not able to complete dialysis on Saturday.  Check echo.  2.  Chest pain-unclear etiology however she has multiple risk factors, however currently she is chest pain-free.  Her initial Trop was mildly elevated however this may be secondary to volume overload in the setting of her ESRD.  Check another troponin, will likely need ischemic evaluation prior to discharge.  Continue aspirin and high intensity statin  3.  Troponin elevation-likely type II, however given multiple risk factors plan for ischemic evaluation prior to discharge  4.  ESRD on dialysis-appreciate nephrology recommendations  5.  Hypertension-continue current medicines    Recommendations:  Check echocardiogram  Nephrology evaluation for further dialysis  Continue aspirin and statin  Check troponin now  Ischemic evaluation prior to discharge    C5    Jhonny Blake MD  [facility]  10/21/2024         [1]   Allergies  Allergen Reactions    Codeine HALLUCINATION    Dilaudid [Hydromorphone] ANXIETY    Tramadol HALLUCINATION    Ventolin [Albuterol] ANXIETY

## 2024-10-21 NOTE — PROGRESS NOTES
Patient seen and examined   Chest: CTA B/L  CVS: S1, S2, RRR  ABD: Soft, NT, ND, BS+  EXT: No c/c    No further CP  Cardio consulted will f/u   Renal consulted for urgent HD  Nebs, anti-emetics ans feels nauseated and dizzy. BP stable  Also give xanax for anxiety/panic issues     Alberto Anglin MD

## 2024-10-21 NOTE — PROGRESS NOTES
[facility] Pharmacy Dosing Service  Antibiotic Dosing    Merlin George is a 58 year old for whom pharmacy is dosing cefepime for treatment of  Recent L paraspinal abscess s/p drainage 9/13-outside hospital. .  Other antibiotics (Not dosed by pharmacy): none    Allergies: is allergic to codeine, dilaudid [hydromorphone], tramadol, and ventolin [albuterol].    Vitals: BP (!) 172/93 (BP Location: Right arm)   Pulse 99   Temp 98.6 °F (37 °C) (Oral)   Resp 21   Ht 1.6 m (5' 3\")   Wt 53 kg (116 lb 12.8 oz)   SpO2 95%   BMI 20.69 kg/m²     Labs:   WBC   Date Value Ref Range Status   10/21/2024 5.6 4.0 - 11.0 x10(3) uL Final     Creatinine   Date Value Ref Range Status   10/21/2024 4.56 (H) 0.55 - 1.02 mg/dL Final     BUN   Date Value Ref Range Status   10/21/2024 27 (H) 9 - 23 mg/dL Final       CrCl: estimated creatinine clearance is 11.1 mL/min (A) (based on SCr of 4.56 mg/dL (H)).    Assessment/Plan: per protocol will give cefepime 1 gram iv every 24 hours; Dose due every 24 hours. On dialysis days give dose after HD to prevent drug elimination during HD. All other days, give as scheduled     Pharmacy will continue to follow.  We appreciate the opportunity to assist in the care of this patient.    Thank you,   Saida Hall PharmD  10/21/2024  2:43 PM

## 2024-10-22 LAB
ANION GAP SERPL CALC-SCNC: 10 MMOL/L (ref 0–18)
BASOPHILS # BLD AUTO: 0.05 X10(3) UL (ref 0–0.2)
BASOPHILS NFR BLD AUTO: 1.2 %
BUN BLD-MCNC: 15 MG/DL (ref 9–23)
C DIFF TOX B STL QL: NEGATIVE
CALCIUM BLD-MCNC: 10.3 MG/DL (ref 8.7–10.4)
CHLORIDE SERPL-SCNC: 101 MMOL/L (ref 98–112)
CO2 SERPL-SCNC: 26 MMOL/L (ref 21–32)
CREAT BLD-MCNC: 3.63 MG/DL
EGFRCR SERPLBLD CKD-EPI 2021: 14 ML/MIN/1.73M2 (ref 60–?)
EOSINOPHIL # BLD AUTO: 0.15 X10(3) UL (ref 0–0.7)
EOSINOPHIL NFR BLD AUTO: 3.5 %
ERYTHROCYTE [DISTWIDTH] IN BLOOD BY AUTOMATED COUNT: 16.2 %
GLUCOSE BLD-MCNC: 113 MG/DL (ref 70–99)
GLUCOSE BLD-MCNC: 99 MG/DL (ref 70–99)
HCT VFR BLD AUTO: 31.2 %
HGB BLD-MCNC: 9.5 G/DL
IMM GRANULOCYTES # BLD AUTO: 0.01 X10(3) UL (ref 0–1)
IMM GRANULOCYTES NFR BLD: 0.2 %
LYMPHOCYTES # BLD AUTO: 0.61 X10(3) UL (ref 1–4)
LYMPHOCYTES NFR BLD AUTO: 14.4 %
MCH RBC QN AUTO: 27.7 PG (ref 26–34)
MCHC RBC AUTO-ENTMCNC: 30.4 G/DL (ref 31–37)
MCV RBC AUTO: 91 FL
MONOCYTES # BLD AUTO: 0.39 X10(3) UL (ref 0.1–1)
MONOCYTES NFR BLD AUTO: 9.2 %
NEUTROPHILS # BLD AUTO: 3.03 X10 (3) UL (ref 1.5–7.7)
NEUTROPHILS # BLD AUTO: 3.03 X10(3) UL (ref 1.5–7.7)
NEUTROPHILS NFR BLD AUTO: 71.5 %
OSMOLALITY SERPL CALC.SUM OF ELEC: 285 MOSM/KG (ref 275–295)
PLATELET # BLD AUTO: 207 10(3)UL (ref 150–450)
POTASSIUM SERPL-SCNC: 4 MMOL/L (ref 3.5–5.1)
RBC # BLD AUTO: 3.43 X10(6)UL
SODIUM SERPL-SCNC: 137 MMOL/L (ref 136–145)
WBC # BLD AUTO: 4.2 X10(3) UL (ref 4–11)

## 2024-10-22 PROCEDURE — 99233 SBSQ HOSP IP/OBS HIGH 50: CPT | Performed by: HOSPITALIST

## 2024-10-22 PROCEDURE — 99233 SBSQ HOSP IP/OBS HIGH 50: CPT | Performed by: INTERNAL MEDICINE

## 2024-10-22 RX ORDER — ALPRAZOLAM 0.25 MG/1
0.25 TABLET ORAL ONCE
Status: COMPLETED | OUTPATIENT
Start: 2024-10-22 | End: 2024-10-22

## 2024-10-22 RX ORDER — ATORVASTATIN CALCIUM 80 MG/1
80 TABLET, FILM COATED ORAL NIGHTLY
Qty: 30 TABLET | Refills: 3 | Status: SHIPPED | OUTPATIENT
Start: 2024-10-22

## 2024-10-22 NOTE — PROGRESS NOTES
[facility]  Nephrology Progress Note    Merlin George Attending:  Alberto Anglin MD       Assessment and Plan:    1) ESRD- due to longstanding HTN / DM; on HD x 6 yrs at  [facility] TTS. Lytes OK but volume up with hypoxia, crackles, pulm edema s/p extra HD 10/21. PLAN- HD today per usual routine     2) Recent L paraspinal abscess s/p drainage  ([place] -> cefepime x 8 weeks)     3) C4 burst fracture with cord compression- 2 stage surgery recommended by ortho but delayed due to #2     4) Thoracic disc herniation s/p T9-12 laminectomy, etc      5) DM 2     6) Anemia- due to CKD / above; continue EPO with HD     7) HTN- continue usual coreg / nifedipine / FERRER / losartan     Consider dc home today after HD      Subjective:  Awake alert no c/o feeling better on 3 L NC O2    Physical Exam:   /77 (BP Location: Right arm)   Pulse 84   Temp 97.9 °F (36.6 °C) (Oral)   Resp 19   Ht 5' 3\" (1.6 m)   Wt 111 lb 1.8 oz (50.4 kg)   SpO2 98%   BMI 19.68 kg/m²   Temp (24hrs), Av.3 °F (36.8 °C), Min:97.9 °F (36.6 °C), Max:98.7 °F (37.1 °C)       Intake/Output Summary (Last 24 hours) at 10/22/2024 0953  Last data filed at 10/21/2024 2014  Gross per 24 hour   Intake 200 ml   Output 2500 ml   Net -2300 ml     Wt Readings from Last 3 Encounters:   10/22/24 111 lb 1.8 oz (50.4 kg)     General: awake alert  HEENT: No scleral icterus, MMM  Neck: Supple, no MICAH or thyromegaly  Cardiac: Regular rate and rhythm, S1, S2 normal, no murmur or tub  Lungs: Decreased BS at bases bilaterally   Abdomen: Soft, non-tender. + bowel sounds, no palpable organomegaly  Extremities: Without clubbing, cyanosis; no edema  Neurologic: Cranial nerves grossly intact, moving all extremities  Skin: Warm and dry, no rashes       Labs:   Lab Results   Component Value Date    WBC 4.2 10/22/2024    HGB 9.5 10/22/2024    HCT 31.2 10/22/2024    .0 10/22/2024    CREATSERUM 3.63 10/22/2024    BUN 15 10/22/2024      10/22/2024    K 4.0 10/22/2024     10/22/2024    CO2 26.0 10/22/2024    GLU 99 10/22/2024    CA 10.3 10/22/2024       Imaging:  All imaging studies reviewed.    Meds:   Current Facility-Administered Medications   Medication Dose Route Frequency    aspirin DR tab 81 mg  81 mg Oral Daily    atorvastatin (Lipitor) tab 40 mg  40 mg Oral Nightly    carvedilol (Coreg) tab 25 mg  25 mg Oral BID with meals    cinacalcet (Sensipar) tab 30 mg  30 mg Oral Daily with breakfast    linaCLOtide CAPS 145 mcg  145 mcg Oral Daily    losartan (Cozaar) tab 50 mg  50 mg Oral Daily    methocarbamol (Robaxin) tab 750 mg  750 mg Oral TID    NIFEdipine ER (Procardia-XL) 24 hr tab 60 mg  60 mg Oral BID    pantoprazole (Protonix) DR tab 40 mg  40 mg Oral QAM AC    sevelamer carbonate (Renvela) tab 1,600 mg  1,600 mg Oral TID CC    melatonin tab 3 mg  3 mg Oral Nightly PRN    acetaminophen (Tylenol Extra Strength) tab 500 mg  500 mg Oral Q4H PRN    polyethylene glycol (PEG 3350) (Miralax) 17 g oral packet 17 g  17 g Oral Daily PRN    sennosides (Senokot) tab 17.2 mg  17.2 mg Oral Nightly PRN    bisacodyl (Dulcolax) 10 MG rectal suppository 10 mg  10 mg Rectal Daily PRN    fleet enema (Fleet) rectal enema 133 mL  1 enema Rectal Once PRN    [Held by provider] ondansetron (Zofran) 4 MG/2ML injection 4 mg  4 mg Intravenous Q6H PRN    prochlorperazine (Compazine) 10 MG/2ML injection 5 mg  5 mg Intravenous Q8H PRN    heparin (Porcine) 5000 UNIT/ML injection 5,000 Units  5,000 Units Subcutaneous Q8H DAVID    ALPRAZolam (Xanax) tab 0.25 mg  0.25 mg Oral TID PRN    ipratropium (Atrovent HFA) 17 MCG/ACT inhaler 2 puff  2 puff Inhalation QID    ceFEPIme (Maxipime) 1 g in sodium chloride 0.9% 100 mL IVPB-MBP  1 g Intravenous Q24H         Questions/concerns were discussed with patient and/or family by bedside.          David Wright MD  10/22/2024  9:53 AM

## 2024-10-22 NOTE — PLAN OF CARE
Patient alert and oriented x 4. On 3L oxygen support , weaned to 2L O2 Support ,sinus on cardiac monitor. Patient denies any chest pain or chest discomfort at this time. Patient anuric /HD , last BM 10/20. Admitted with SOB, Oxygen weaning ,now comfortable .scds applied, LT arm fistula site intact ,Cefepime 1 gm iv q24hrs  on dialysis day getting ,no acute distress noted, made comfortable, mepilex changed bottom, Plan of care updated, all questions answered. Safety precautions in place. Bed alarm on. Will continue to monitor tele/labs/vital signs closely.     Plan:   Daily weight, strict I&o ,pain  mgt, next dialysis order    Problem: Diabetes/Glucose Control  Goal: Glucose maintained within prescribed range  Description: INTERVENTIONS:  - Monitor Blood Glucose as ordered  - Assess for signs and symptoms of hyperglycemia and hypoglycemia  - Administer ordered medications to maintain glucose within target range  - Assess barriers to adequate nutritional intake and initiate nutrition consult as needed  - Instruct patient on self management of diabetes  Outcome: Progressing     Problem: Patient/Family Goals  Goal: Patient/Family Long Term Goal  Description: Patient's Long Term Goal: stay healthy    Interventions:  - HD  -Medication management  -Dietary management  -  - See additional Care Plan goals for specific interventions  Outcome: Progressing  Goal: Patient/Family Short Term Goal  Description: Patient's Short Term Goal: discharge home    Interventions:   - HD today  -Anti anxiety medication  -Emotional support  -Wean O2  - See additional Care Plan goals for specific interventions  Outcome: Progressing     Problem: PAIN - ADULT  Goal: Verbalizes/displays adequate comfort level or patient's stated pain goal  Description: INTERVENTIONS:  - Encourage pt to monitor pain and request assistance  - Assess pain using appropriate pain scale  - Administer analgesics based on type and severity of pain and evaluate response  -  Implement non-pharmacological measures as appropriate and evaluate response  - Consider cultural and social influences on pain and pain management  - Manage/alleviate anxiety  - Utilize distraction and/or relaxation techniques  - Monitor for opioid side effects  - Notify MD/LIP if interventions unsuccessful or patient reports new pain  - Anticipate increased pain with activity and pre-medicate as appropriate  Outcome: Progressing     Problem: RISK FOR INFECTION - ADULT  Goal: Absence of fever/infection during anticipated neutropenic period  Description: INTERVENTIONS  - Monitor WBC  - Administer growth factors as ordered  - Implement neutropenic guidelines  Outcome: Progressing     Problem: SAFETY ADULT - FALL  Goal: Free from fall injury  Description: INTERVENTIONS:  - Assess pt frequently for physical needs  - Identify cognitive and physical deficits and behaviors that affect risk of falls.  - [place] fall precautions as indicated by assessment.  - Educate pt/family on patient safety including physical limitations  - Instruct pt to call for assistance with activity based on assessment  - Modify environment to reduce risk of injury  - Provide assistive devices as appropriate  - Consider OT/PT consult to assist with strengthening/mobility  - Encourage toileting schedule  Outcome: Progressing     Problem: DISCHARGE PLANNING  Goal: Discharge to home or other facility with appropriate resources  Description: INTERVENTIONS:  - Identify barriers to discharge w/pt and caregiver  - Include patient/family/discharge partner in discharge planning  - Arrange for needed discharge resources and transportation as appropriate  - Identify discharge learning needs (meds, wound care, etc)  - Arrange for interpreters to assist at discharge as needed  - Consider post-discharge preferences of patient/family/discharge partner  - Complete POLST form as appropriate  - Assess patient's ability to be responsible for managing their own  health  - Refer to Case Management Department for coordinating discharge planning if the patient needs post-hospital services based on physician/LIP order or complex needs related to functional status, cognitive ability or social support system  Outcome: Progressing     Problem: CARDIOVASCULAR - ADULT  Goal: Maintains optimal cardiac output and hemodynamic stability  Description: INTERVENTIONS:  - Monitor vital signs, rhythm, and trends  - Monitor for bleeding, hypotension and signs of decreased cardiac output  - Evaluate effectiveness of vasoactive medications to optimize hemodynamic stability  - Monitor arterial and/or venous puncture sites for bleeding and/or hematoma  - Assess quality of pulses, skin color and temperature  - Assess for signs of decreased coronary artery perfusion - ex. Angina  - Evaluate fluid balance, assess for edema, trend weights  Outcome: Progressing  Goal: Absence of cardiac arrhythmias or at baseline  Description: INTERVENTIONS:  - Continuous cardiac monitoring, monitor vital signs, obtain 12 lead EKG if indicated  - Evaluate effectiveness of antiarrhythmic and heart rate control medications as ordered  - Initiate emergency measures for life threatening arrhythmias  - Monitor electrolytes and administer replacement therapy as ordered  Outcome: Progressing     Problem: RESPIRATORY - ADULT  Goal: Achieves optimal ventilation and oxygenation  Description: INTERVENTIONS:  - Assess for changes in respiratory status  - Assess for changes in mentation and behavior  - Position to facilitate oxygenation and minimize respiratory effort  - Oxygen supplementation based on oxygen saturation or ABGs  - Provide Smoking Cessation handout, if applicable  - Encourage broncho-pulmonary hygiene including cough, deep breathe, Incentive Spirometry  - Assess the need for suctioning and perform as needed  - Assess and instruct to report SOB or any respiratory difficulty  - Respiratory Therapy support as  indicated  - Manage/alleviate anxiety  - Monitor for signs/symptoms of CO2 retention  Outcome: Progressing     Problem: GASTROINTESTINAL - ADULT  Goal: Minimal or absence of nausea and vomiting  Description: INTERVENTIONS:  - Maintain adequate hydration with IV or PO as ordered and tolerated  - Nasogastric tube to low intermittent suction as ordered  - Evaluate effectiveness of ordered antiemetic medications  - Provide nonpharmacologic comfort measures as appropriate  - Advance diet as tolerated, if ordered  - Obtain nutritional consult as needed  - Evaluate fluid balance  Outcome: Progressing  Goal: Maintains or returns to baseline bowel function  Description: INTERVENTIONS:  - Assess bowel function  - Maintain adequate hydration with IV or PO as ordered and tolerated  - Evaluate effectiveness of GI medications  - Encourage mobilization and activity  - Obtain nutritional consult as needed  - Establish a toileting routine/schedule  - Consider collaborating with pharmacy to review patient's medication profile  Outcome: Progressing  Goal: Maintains adequate nutritional intake (undernourished)  Description: INTERVENTIONS:  - Monitor percentage of each meal consumed  - Identify factors contributing to decreased intake, treat as appropriate  - Assist with meals as needed  - Monitor I&O, WT and lab values  - Obtain nutritional consult as needed  - Optimize oral hygiene and moisture  - Encourage food from home; allow for food preferences  - Enhance eating environment  Outcome: Progressing     Problem: METABOLIC/FLUID AND ELECTROLYTES - ADULT  Goal: Glucose maintained within prescribed range  Description: INTERVENTIONS:  - Monitor Blood Glucose as ordered  - Assess for signs and symptoms of hyperglycemia and hypoglycemia  - Administer ordered medications to maintain glucose within target range  - Assess barriers to adequate nutritional intake and initiate nutrition consult as needed  - Instruct patient on self management of  diabetes  Outcome: Progressing     Problem: SKIN/TISSUE INTEGRITY - ADULT  Goal: Skin integrity remains intact  Description: INTERVENTIONS  - Assess and document risk factors for pressure ulcer development  - Assess and document skin integrity  - Monitor for areas of redness and/or skin breakdown  - Initiate interventions, skin care algorithm/standards of care as needed  Outcome: Progressing     Problem: HEMATOLOGIC - ADULT  Goal: Maintains hematologic stability  Description: INTERVENTIONS  - Assess for signs and symptoms of bleeding or hemorrhage  - Monitor labs and vital signs for trends  - Administer supportive blood products/factors, fluids and medications as ordered and appropriate  - Administer supportive blood products/factors as ordered and appropriate  Outcome: Progressing

## 2024-10-22 NOTE — PHYSICAL THERAPY NOTE
PHYSICAL THERAPY EVALUATION - INPATIENT     Room Number: 8609/8609-A  Evaluation Date: 10/22/2024  Type of Evaluation: Initial  Physician Order: PT Eval and Treat    Presenting Problem: chest pain  Co-Morbidities : ESRD on HD, HTN, DM, s/p T9-T12 lami and fusion on 8/25/24; C4 burst fx with severe cord compression and myelomalacia; s/p aspiration by IR on 9/13/24  Reason for Therapy: Mobility Dysfunction and Discharge Planning    PHYSICAL THERAPY ASSESSMENT   Patient is a 58 year old female admitted 10/21/2024 for chest pain.  Prior to admission, patient's baseline is CGA - min A with RW.  Patient is currently functioning near baseline with bed mobility, transfers, and gait.  Patient is requiring minimal assist as a result of the following impairments: decreased functional strength, decreased endurance/aerobic capacity, pain, impaired   balance, and decreased muscular endurance.  Physical Therapy will continue to follow for duration of hospitalization.    Patient will benefit from continued skilled PT Services at discharge to promote prior level of function and safety with additional support and return home with home health PT.    PLAN DURING HOSPITALIZATION  Nursing Mobility Recommendation : 1 Assist  PT Device Recommendation: Rolling walker;Gait belt  PT Treatment Plan: Bed mobility;Body mechanics;Don/doff brace;Endurance;Energy conservation;Patient education;Family education;Gait training;Strengthening;Transfer training;Balance training  Rehab Potential : Good  Frequency (Obs): 3-5x/week     CURRENT GOALS    Goal #1 Patient is able to demonstrate supine - sit EOB @ level: supervision     Goal #2 Patient is able to demonstrate transfers Sit to/from Stand at assistance level: supervision     Goal #3 Patient is able to ambulate 150 feet with assist device: walker - rolling at assistance level: supervision     Goal #4    Goal #5    Goal #6    Goal Comments: Goals established on 10/22/2024      PHYSICAL THERAPY  MEDICAL/SOCIAL HISTORY  History related to current admission: Patient is a 58 year old female admitted on 10/21/2024 from home for chest pain.    Chest xray -   Cardiomegaly with pulmonary vascular congestion, increased interstitial markings in the lungs, and patchy ground-glass opacity is suggestive of congestive failure with pulmonary edema.     Pt with recent history of T9-T12 lami and fusion on 8/25/24; C4 burst fx with severe cord compression and myelomalacia; s/p aspiration by IR on 9/13/24. Pt reports she no longer needs to wear TLSO. Pt reports she is getting a new cervical collar brace but is cleared by her surgeon to mobilize without it in the meantime.    HOME SITUATION  Type of Home: House  Home Layout: Able to live on main level  Stairs to Enter : 1                  Lives With: Family;Caregiver part-time    Drives: No          Prior Level of [place]: Pt reports she has a CG M-F in the mornings until 2 pm and she lives with her brother who works from home. Pt gets assist with ADLs. Pt gets assist every time she gets up. Pt ambulates with RW and CGA to min assist. Pt is awaiting clearance to travel to return [facility] the [facility].     SUBJECTIVE  Pt pleasant and cooperative    OBJECTIVE  Precautions: Bed/chair alarm;Spine;Cervical brace;Limb alert - left  Fall Risk: High fall risk    WEIGHT BEARING RESTRICTION     PAIN ASSESSMENT  Rating: Unable to rate  Location: back  Management Techniques: Activity promotion;Body mechanics;Relaxation;Repositioning    COGNITION  Overall Cognitive Status:  WFL - within functional limits    RANGE OF MOTION AND STRENGTH ASSESSMENT  Upper extremity ROM and strength are within functional limits     Lower extremity ROM is within functional limits     Lower extremity strength is within functional limits, except generalized weakness    BALANCE  Static Sitting: Fair -  Dynamic Sitting: Poor +  Static Standing: Poor +  Dynamic Standing: Poor +    ADDITIONAL TESTS                                     ACTIVITY TOLERANCE                         O2 WALK       NEUROLOGICAL FINDINGS                        AM-PAC '6-Clicks' INPATIENT SHORT FORM - BASIC MOBILITY  How much difficulty does the patient currently have...  Patient Difficulty: Turning over in bed (including adjusting bedclothes, sheets and blankets)?: A Little   Patient Difficulty: Sitting down on and standing up from a chair with arms (e.g., wheelchair, bedside commode, etc.): A Little   Patient Difficulty: Moving from lying on back to sitting on the side of the bed?: A Little   How much help from another person does the patient currently need...   Help from Another: Moving to and from a bed to a chair (including a wheelchair)?: A Little   Help from Another: Need to walk in hospital room?: A Little   Help from Another: Climbing 3-5 steps with a railing?: A Lot     AM-PAC Score:  Raw Score: 17   Approx Degree of Impairment: 50.57%   Standardized Score (AM-PAC Scale): 42.13   CMS Modifier (G-Code): CK    FUNCTIONAL ABILITY STATUS  Gait Assessment   Functional Mobility/Gait Assessment  Gait Assistance: Minimum assistance  Distance (ft): 10  Assistive Device: Rolling walker  Pattern: Shuffle;L Flexed knee;R Flexed knee    Skilled Therapy Provided: Per RN okay to work with pt. Pt received in chair and was agreeable to PT session.     Bed Mobility:  Rolling: NT  Supine to sit: NT   Sit to supine: NT     Transfer Mobility:  Sit to stand: min A    Stand to sit: CGA  Gait = pt ambulated with RW and min A    Therapist's Comments: pt educated on role of therapy, goals for session, safety, fall prevention, spine precautions, and activity recommendations.     Exercise/Education Provided:  Energy conservation  Functional activity tolerated  Gait training  Posture  Strengthening  Transfer training    Patient End of Session: Up in chair;Needs met;Call light within reach;RN aware of session/findings;Bracing education provided per handout;All patient  questions and concerns addressed;Hospital anti-slip socks;Discussed recommendations with /      Patient Evaluation Complexity Level:  History Moderate - 1 or 2 personal factors and/or co-morbidities   Examination of body systems Low -  addressing 1-2 elements   Clinical Presentation Low- Stable   Clinical Decision Making Low Complexity       PT Session Time: 25 minutes  Therapeutic Activity: 8 minutes

## 2024-10-22 NOTE — PLAN OF CARE
Assumed care of patient around 0730.Patient AXOX4.NSR on tele.On 1L/NC.No c/o pain at this time,feels better today,denies any difficulty in breathing.Patient up in chair with 1 moderate assist.Plan for HD today,notified Dominguez.Plan of care updated.  Problem: Diabetes/Glucose Control  Goal: Glucose maintained within prescribed range  Description: INTERVENTIONS:  - Monitor Blood Glucose as ordered  - Assess for signs and symptoms of hyperglycemia and hypoglycemia  - Administer ordered medications to maintain glucose within target range  - Assess barriers to adequate nutritional intake and initiate nutrition consult as needed  - Instruct patient on self management of diabetes  Outcome: Progressing     Problem: Patient/Family Goals  Goal: Patient/Family Long Term Goal  Description: Patient's Long Term Goal: stay healthy    Interventions:  - HD  -Medication management  -Dietary management  -  - See additional Care Plan goals for specific interventions  Outcome: Progressing  Goal: Patient/Family Short Term Goal  Description: Patient's Short Term Goal: discharge home    Interventions:   - HD today  -Anti anxiety medication  -Emotional support  -Wean O2  - See additional Care Plan goals for specific interventions  Outcome: Progressing     Problem: PAIN - ADULT  Goal: Verbalizes/displays adequate comfort level or patient's stated pain goal  Description: INTERVENTIONS:  - Encourage pt to monitor pain and request assistance  - Assess pain using appropriate pain scale  - Administer analgesics based on type and severity of pain and evaluate response  - Implement non-pharmacological measures as appropriate and evaluate response  - Consider cultural and social influences on pain and pain management  - Manage/alleviate anxiety  - Utilize distraction and/or relaxation techniques  - Monitor for opioid side effects  - Notify MD/LIP if interventions unsuccessful or patient reports new pain  - Anticipate increased pain with activity  and pre-medicate as appropriate  Outcome: Progressing     Problem: RISK FOR INFECTION - ADULT  Goal: Absence of fever/infection during anticipated neutropenic period  Description: INTERVENTIONS  - Monitor WBC  - Administer growth factors as ordered  - Implement neutropenic guidelines  Outcome: Progressing     Problem: SAFETY ADULT - FALL  Goal: Free from fall injury  Description: INTERVENTIONS:  - Assess pt frequently for physical needs  - Identify cognitive and physical deficits and behaviors that affect risk of falls.  - [place] fall precautions as indicated by assessment.  - Educate pt/family on patient safety including physical limitations  - Instruct pt to call for assistance with activity based on assessment  - Modify environment to reduce risk of injury  - Provide assistive devices as appropriate  - Consider OT/PT consult to assist with strengthening/mobility  - Encourage toileting schedule  Outcome: Progressing

## 2024-10-22 NOTE — PROGRESS NOTES
[facility]   part of [facility]     Cardiology Progress Note        Merlin George Patient Status:  Observation    10/27/1965 MRN [id number]   Location [facility] 8NE-A Attending Alberto Anglin MD   Hosp Day # 0 PCP None Pcp     Subjective/ROS:  Up in chair, feels that her breathing is dramatically better. No new complaints    Objective:  /77 (BP Location: Right arm)   Pulse 85   Temp 97.9 °F (36.6 °C) (Oral)   Resp 19   Ht 5' 3\" (1.6 m)   Wt 111 lb 1.8 oz (50.4 kg)   SpO2 98%   BMI 19.68 kg/m²     Temp (24hrs), Av.3 °F (36.8 °C), Min:97.9 °F (36.6 °C), Max:98.7 °F (37.1 °C)      Tele  nsr    Intake/Output:    Intake/Output Summary (Last 24 hours) at 10/22/2024 1145  Last data filed at 10/21/2024 2014  Gross per 24 hour   Intake 200 ml   Output 2500 ml   Net -2300 ml       Patient Weight for the past 72 hrs:   Weight   10/21/24 0330 120 lb (54.4 kg)   10/21/24 0525 116 lb 8 oz (52.8 kg)   10/21/24 0607 116 lb 12.8 oz (53 kg)   10/22/24 0337 111 lb 1.8 oz (50.4 kg)        Physical Exam:    Gen: alert, oriented x 3, NAD  Heent: pupils equal, reactive. Mucous membranes moist.   Neck: no jvd  Cardiac: regular rate and rhythm, normal S1,S2, soft sunita, + diastolic murmur  Lungs: CTA  Abd: soft, NT/ND +bs  Ext: no significant edema  Skin: Warm, dry  Neuro: No focal deficits    Labs:  Lab Results   Component Value Date    WBC 4.2 10/22/2024    HGB 9.5 10/22/2024    HCT 31.2 10/22/2024    .0 10/22/2024    CREATSERUM 3.63 10/22/2024    BUN 15 10/22/2024     10/22/2024    K 4.0 10/22/2024     10/22/2024    CO2 26.0 10/22/2024    GLU 99 10/22/2024    CA 10.3 10/22/2024       CXR: Reviewed. Cardiomegaly with pulmonary vascular congestion, increased interstitial markings in the lungs, and patchy ground-glass opacity is suggestive of congestive failure with pulmonary edema.  Clinical correlation recommended.       Medications:     ALPRAZolam  0.25 mg Oral Once    aspirin   81 mg Oral Daily    atorvastatin  40 mg Oral Nightly    carvedilol  25 mg Oral BID with meals    cinacalcet  30 mg Oral Daily with breakfast    linaCLOtide  145 mcg Oral Daily    losartan  50 mg Oral Daily    methocarbamol  750 mg Oral TID    NIFEdipine ER  60 mg Oral BID    pantoprazole  40 mg Oral QAM AC    sevelamer carbonate  1,600 mg Oral TID CC    heparin  5,000 Units Subcutaneous Q8H DAVID    ipratropium  2 puff Inhalation QID    cefepime  1 g Intravenous Q24H         Assessment:    Chest pain, minimally elevated trop (42, 41, 43) in setting of esrd  No recurrent cp. EKG without acute ischemic changes  Echo with preserved LVEF, no rwma  Moderate to severe mitral stenosis- mean diastolic gradient 9 mmHg (hr 97)  Echo 5/2023 without stenosis  Esrd on dialysis M/W/F with volume overload on admission  Volume status and sob much improved following HD yesterday. Plan for repeat treatment today  Htn- fair control  Hl- LDL 90, on lipitor 40. Given esrd, would shoot for LDL of 70 or less  Recent L paraspinal abscess s/p drainage 9/13/24 (serratia, on 8 weeks of abx)  C4 fx with cord compression- surgery on hold in setting above    Plan:     Likely home today after dialysis  Can have cardiac pet as an outpatient and follow up with Dr Blake in clinic after    Discussed plan of care with patient and nursing.       Angeles Calvo NP  [phone]        Physician addendum:  I have seen and examined the patient agree with note as outlined by APN note above.    58-year-old female with history of ESRD on dialysis Monday Wednesday Friday through fistula, hypertension, hyperlipidemia, recent spinal surgery complicated by infection now on antibiotics for 8 weeks, presented with chest pain and shortness of breath.  She was not able to tolerate her prior dialysis session was found to be hypervolemic.  She is now status post 2 dialysis sessions in a row and is feeling better.  Her echo showed high flow state across her aortic and  mitral valves she had a gradient of 9, which is out of proportion to the valves appearance on echo.  Regarding her chest pain she had normal troponins, and is chest pain free now we will plan for outpatient ischemic evaluation with follow-up with me in 1 week.  Continue Coreg 25 mg twice daily, aspirin 81 mg daily  High intensity statin with a atorvastatin 40 and her remainder of her blood pressure medicines.    L3    Jhonny Blake MD  Interventional cardiology  [facility]

## 2024-10-22 NOTE — PROGRESS NOTES
[facility]   part of [facility]     Hospitalist Progress Note     Merlin George Patient Status:  Observation    10/27/1965 MRN [id number]   Location [facility] 8NE-A Attending Alberto Anglin MD   Hosp Day # 0 PCP None Pcp     Subjective:   Feels much better     Objective:    Review of Systems:   A comprehensive review of systems was completed; pertinent positive and negatives stated in subjective.  Vital signs:  Temp:  [97.9 °F (36.6 °C)-98.7 °F (37.1 °C)] 98.1 °F (36.7 °C)  Pulse:  [] 84  Resp:  [20-21] 20  BP: (130-172)/(73-93) 133/73  SpO2:  [94 %-98 %] 95 %  Physical Exam:    General: No acute distress    Respiratory: no wheezes, no rhonchi  Cardiovascular: S1, S2, RRR  Abdomen: Soft, NT/ND, +BS  Extremities: no edema    Diagnostic Data:    Labs:  Recent Labs   Lab 10/21/24  0330 10/22/24  0512   WBC 5.6 4.2   HGB 10.4* 9.5*   MCV 89.2 91.0   .0 207.0     Recent Labs   Lab 10/21/24  0331 10/22/24  0512   * 99   BUN 27* 15   CREATSERUM 4.56* 3.63*   CA 10.2 10.3   ALB 4.0  --     137   K 3.8 4.0   CL 98 101   CO2 31.0 26.0   ALKPHO 183*  --    AST 24  --    ALT <7*  --    BILT 0.2*  --    TP 7.6  --      Estimated Creatinine Clearance: 13.4 mL/min (A) (based on SCr of 3.63 mg/dL (H)).  No results for input(s): \"PTP\", \"INR\" in the last 168 hours.     Microbiology  No results found for this visit on 10/21/24.  Imaging: Reviewed in Epic.  Medications:    aspirin  81 mg Oral Daily    atorvastatin  40 mg Oral Nightly    carvedilol  25 mg Oral BID with meals    cinacalcet  30 mg Oral Daily with breakfast    linaCLOtide  145 mcg Oral Daily    losartan  50 mg Oral Daily    methocarbamol  750 mg Oral TID    NIFEdipine ER  60 mg Oral BID    pantoprazole  40 mg Oral QAM AC    sevelamer carbonate  1,600 mg Oral TID CC    heparin  5,000 Units Subcutaneous Q8H DAVID    ipratropium  2 puff Inhalation QID    cefepime  1 g Intravenous Q24H     Assessment & Plan:    #Chest pain  - ACS  ruled out   - Echo in the am  - Cardiology on consult    #Anxiety/Panic attacks- Xanax PRN     #Acute pulmonary edema- HD per renal again today      # ESRD on HD- per renal     #Recent paraspinal abscess- IV Cefepime 8 weeks    #C4 burst fracture with cord compression- multiple surgeries recommended but on hold 2/2 infection- f/u as outpt     #Thoracic disc herniation s/p laminectomy- pain control     # Hypertension- coreg, hydralazine, nifedepine and losartan.     # HFpEF- Echo in 2023 show EF of 55% with grade 1 diastolic dysfunction.     # Hyperlipidemia- statin      # GERD- PPI         Supplementary Documentation:   Quality:  DVT Mechanical Prophylaxis:   SCDs,    DVT Pharmacologic Prophylaxis   Medication    heparin (Porcine) 5000 UNIT/ML injection 5,000 Units         DVT Pharmacologic prophylaxis: Aspirin 81 mg      Code Status: Not on file  Mitchell: No urinary catheter in place  Mitchell Duration (in days):   Central line:    CHELY: 10/23/2024  At this point Ms. Simmons is expected to be discharge to: home     The 21st Century Cures Act makes medical notes like these available to patients in the interest of transparency. Please be advised this is a medical document. Medical documents are intended to carry relevant information, facts as evident, and the clinical opinion of the practitioner. The medical note is intended as peer to peer communication and may appear blunt or direct. It is written in medical language and may contain abbreviations or verbiage that are unfamiliar.

## 2024-10-23 VITALS
SYSTOLIC BLOOD PRESSURE: 136 MMHG | DIASTOLIC BLOOD PRESSURE: 74 MMHG | RESPIRATION RATE: 18 BRPM | TEMPERATURE: 99 F | BODY MASS INDEX: 19.11 KG/M2 | HEIGHT: 63 IN | WEIGHT: 107.88 LBS | HEART RATE: 82 BPM | OXYGEN SATURATION: 100 %

## 2024-10-23 LAB — GLUCOSE BLD-MCNC: 121 MG/DL (ref 70–99)

## 2024-10-23 PROCEDURE — 99239 HOSP IP/OBS DSCHRG MGMT >30: CPT | Performed by: HOSPITALIST

## 2024-10-23 PROCEDURE — 99232 SBSQ HOSP IP/OBS MODERATE 35: CPT | Performed by: INTERNAL MEDICINE

## 2024-10-23 RX ORDER — ALPRAZOLAM 0.25 MG/1
0.25 TABLET ORAL 3 TIMES DAILY PRN
Qty: 30 TABLET | Refills: 0 | Status: SHIPPED | OUTPATIENT
Start: 2024-10-23

## 2024-10-23 NOTE — PLAN OF CARE
Pt is ok to discharge per primary and consults. Discharge instructions including meds & follow ups given. Patient verbalizes understanding & questions answered. IV removed, tele monitor discontinued, all belongings taken with patient. Fistula in place at R arm. Pt transported off unit via wheelchair to [place]/ortho appt with daughter.

## 2024-10-23 NOTE — PROGRESS NOTES
[facility]  Nephrology Progress Note    Merlin George Attending:  Alberto Anglin MD       Assessment and Plan:    1) ESRD- due to longstanding HTN / DM; on HD x 6 yrs at  [facility]. Lytes OK but volume up with hypoxia, crackles, pulm edema s/p extra HD 10/21 + routine 10/22. Now euvolemic, on RA.      2) Recent L paraspinal abscess s/p drainage  (Serratia -> cefepime x 8 weeks)     3) C4 burst fracture with cord compression- 2 stage surgery recommended by ortho but delayed due to #2     4) Thoracic disc herniation s/p T9-12 laminectomy, etc      5) DM 2     6) Anemia- due to CKD / above; continue EPO with HD     7) HTN- continue usual coreg / nifedipine / FERRER / losartan     Next HD Thurs as outpt- anticipate dc home.       Subjective:  Awake alert no c/o feeling better on RA this AM    Physical Exam:   /74 (BP Location: Right arm)   Pulse 82   Temp 98.6 °F (37 °C) (Oral)   Resp 18   Ht 5' 3\" (1.6 m)   Wt 107 lb 14.4 oz (48.9 kg)   SpO2 100%   BMI 19.11 kg/m²   Temp (24hrs), Av.9 °F (36.6 °C), Min:96.8 °F (36 °C), Max:98.6 °F (37 °C)       Intake/Output Summary (Last 24 hours) at 10/23/2024 1017  Last data filed at 10/23/2024 0350  Gross per 24 hour   Intake 360 ml   Output 1500 ml   Net -1140 ml     Wt Readings from Last 3 Encounters:   10/23/24 107 lb 14.4 oz (48.9 kg)     General: awake alert  HEENT: No scleral icterus, MMM  Neck: Supple, no MICAH or thyromegaly  Cardiac: Regular rate and rhythm, S1, S2 normal, no murmur or tub  Lungs: Decreased BS at bases bilaterally   Abdomen: Soft, non-tender. + bowel sounds, no palpable organomegaly  Extremities: Without clubbing, cyanosis; no edema  Neurologic: Cranial nerves grossly intact, moving all extremities  Skin: Warm and dry, no rashes       Labs:   Lab Results   Component Value Date    PGLU 121 10/23/2024       Imaging:  All imaging studies reviewed.    Meds:   Current Facility-Administered Medications   Medication  Dose Route Frequency    aspirin DR tab 81 mg  81 mg Oral Daily    atorvastatin (Lipitor) tab 40 mg  40 mg Oral Nightly    carvedilol (Coreg) tab 25 mg  25 mg Oral BID with meals    cinacalcet (Sensipar) tab 30 mg  30 mg Oral Daily with breakfast    linaCLOtide CAPS 145 mcg  145 mcg Oral Daily    losartan (Cozaar) tab 50 mg  50 mg Oral Daily    methocarbamol (Robaxin) tab 750 mg  750 mg Oral TID    NIFEdipine ER (Procardia-XL) 24 hr tab 60 mg  60 mg Oral BID    pantoprazole (Protonix) DR tab 40 mg  40 mg Oral QAM AC    sevelamer carbonate (Renvela) tab 1,600 mg  1,600 mg Oral TID CC    melatonin tab 3 mg  3 mg Oral Nightly PRN    acetaminophen (Tylenol Extra Strength) tab 500 mg  500 mg Oral Q4H PRN    polyethylene glycol (PEG 3350) (Miralax) 17 g oral packet 17 g  17 g Oral Daily PRN    sennosides (Senokot) tab 17.2 mg  17.2 mg Oral Nightly PRN    bisacodyl (Dulcolax) 10 MG rectal suppository 10 mg  10 mg Rectal Daily PRN    fleet enema (Fleet) rectal enema 133 mL  1 enema Rectal Once PRN    [Held by provider] ondansetron (Zofran) 4 MG/2ML injection 4 mg  4 mg Intravenous Q6H PRN    prochlorperazine (Compazine) 10 MG/2ML injection 5 mg  5 mg Intravenous Q8H PRN    heparin (Porcine) 5000 UNIT/ML injection 5,000 Units  5,000 Units Subcutaneous Q8H DAVID    ALPRAZolam (Xanax) tab 0.25 mg  0.25 mg Oral TID PRN    ipratropium (Atrovent HFA) 17 MCG/ACT inhaler 2 puff  2 puff Inhalation QID    ceFEPIme (Maxipime) 1 g in sodium chloride 0.9% 100 mL IVPB-MBP  1 g Intravenous Q24H         Questions/concerns were discussed with patient and/or family by bedside.          David Wright MD  10/23/2024  1017 AM

## 2024-10-23 NOTE — PLAN OF CARE
Received pt at 0730.  Pt is A&Ox4, forgetful, pain in back- PRN tylenol provided. On room air during day, 2-3L while sleeping/laying down- per daughter- has 02 set up at home. NSR, no chest pain. Diminished urine production, HD patient, continent of bowel, last BM 10-22. Fistula present at L arm, no complications. Patient requesting to leave by 10 AM to get to ortho appt at 1015. Pt updated with plan of care.       Problem: Diabetes/Glucose Control  Goal: Glucose maintained within prescribed range  Description: INTERVENTIONS:  - Monitor Blood Glucose as ordered  - Assess for signs and symptoms of hyperglycemia and hypoglycemia  - Administer ordered medications to maintain glucose within target range  - Assess barriers to adequate nutritional intake and initiate nutrition consult as needed  - Instruct patient on self management of diabetes  Outcome: Progressing     Problem: Patient/Family Goals  Goal: Patient/Family Long Term Goal  Description: Patient's Long Term Goal: stay healthy  Stay out of hospital 10-23    Interventions:  - HD  -Medication management  -Dietary management  -follow ups    - See additional Care Plan goals for specific interventions  Outcome: Progressing  Goal: Patient/Family Short Term Goal  Description: Patient's Short Term Goal: discharge home  No pain 10-23    Interventions:   - HD today  -Anti anxiety medication  -Emotional support  -Wean O2  - PRN meds, hot/cold packs, tell nurse if in pain, follow up with ortho      - See additional Care Plan goals for specific interventions  Outcome: Progressing     Problem: PAIN - ADULT  Goal: Verbalizes/displays adequate comfort level or patient's stated pain goal  Description: INTERVENTIONS:  - Encourage pt to monitor pain and request assistance  - Assess pain using appropriate pain scale  - Administer analgesics based on type and severity of pain and evaluate response  - Implement non-pharmacological measures as appropriate and evaluate response  -  Consider cultural and social influences on pain and pain management  - Manage/alleviate anxiety  - Utilize distraction and/or relaxation techniques  - Monitor for opioid side effects  - Notify MD/LIP if interventions unsuccessful or patient reports new pain  - Anticipate increased pain with activity and pre-medicate as appropriate  Outcome: Not Progressing     Problem: RISK FOR INFECTION - ADULT  Goal: Absence of fever/infection during anticipated neutropenic period  Description: INTERVENTIONS  - Monitor WBC  - Administer growth factors as ordered  - Implement neutropenic guidelines  Outcome: Progressing     Problem: SAFETY ADULT - FALL  Goal: Free from fall injury  Description: INTERVENTIONS:  - Assess pt frequently for physical needs  - Identify cognitive and physical deficits and behaviors that affect risk of falls.  - [place] fall precautions as indicated by assessment.  - Educate pt/family on patient safety including physical limitations  - Instruct pt to call for assistance with activity based on assessment  - Modify environment to reduce risk of injury  - Provide assistive devices as appropriate  - Consider OT/PT consult to assist with strengthening/mobility  - Encourage toileting schedule  Outcome: Progressing     Problem: DISCHARGE PLANNING  Goal: Discharge to home or other facility with appropriate resources  Description: INTERVENTIONS:  - Identify barriers to discharge w/pt and caregiver  - Include patient/family/discharge partner in discharge planning  - Arrange for needed discharge resources and transportation as appropriate  - Identify discharge learning needs (meds, wound care, etc)  - Arrange for interpreters to assist at discharge as needed  - Consider post-discharge preferences of patient/family/discharge partner  - Complete POLST form as appropriate  - Assess patient's ability to be responsible for managing their own health  - Refer to Case Management Department for coordinating discharge  planning if the patient needs post-hospital services based on physician/LIP order or complex needs related to functional status, cognitive ability or social support system  Outcome: Progressing     Problem: RESPIRATORY - ADULT  Goal: Achieves optimal ventilation and oxygenation  Description: INTERVENTIONS:  - Assess for changes in respiratory status  - Assess for changes in mentation and behavior  - Position to facilitate oxygenation and minimize respiratory effort  - Oxygen supplementation based on oxygen saturation or ABGs  - Provide Smoking Cessation handout, if applicable  - Encourage broncho-pulmonary hygiene including cough, deep breathe, Incentive Spirometry  - Assess the need for suctioning and perform as needed  - Assess and instruct to report SOB or any respiratory difficulty  - Respiratory Therapy support as indicated  - Manage/alleviate anxiety  - Monitor for signs/symptoms of CO2 retention  Outcome: Progressing     Problem: CARDIOVASCULAR - ADULT  Goal: Maintains optimal cardiac output and hemodynamic stability  Description: INTERVENTIONS:  - Monitor vital signs, rhythm, and trends  - Monitor for bleeding, hypotension and signs of decreased cardiac output  - Evaluate effectiveness of vasoactive medications to optimize hemodynamic stability  - Monitor arterial and/or venous puncture sites for bleeding and/or hematoma  - Assess quality of pulses, skin color and temperature  - Assess for signs of decreased coronary artery perfusion - ex. Angina  - Evaluate fluid balance, assess for edema, trend weights  Outcome: Progressing  Goal: Absence of cardiac arrhythmias or at baseline  Description: INTERVENTIONS:  - Continuous cardiac monitoring, monitor vital signs, obtain 12 lead EKG if indicated  - Evaluate effectiveness of antiarrhythmic and heart rate control medications as ordered  - Initiate emergency measures for life threatening arrhythmias  - Monitor electrolytes and administer replacement therapy as  ordered  Outcome: Progressing     Problem: GASTROINTESTINAL - ADULT  Goal: Minimal or absence of nausea and vomiting  Description: INTERVENTIONS:  - Maintain adequate hydration with IV or PO as ordered and tolerated  - Nasogastric tube to low intermittent suction as ordered  - Evaluate effectiveness of ordered antiemetic medications  - Provide nonpharmacologic comfort measures as appropriate  - Advance diet as tolerated, if ordered  - Obtain nutritional consult as needed  - Evaluate fluid balance  Outcome: Progressing  Goal: Maintains or returns to baseline bowel function  Description: INTERVENTIONS:  - Assess bowel function  - Maintain adequate hydration with IV or PO as ordered and tolerated  - Evaluate effectiveness of GI medications  - Encourage mobilization and activity  - Obtain nutritional consult as needed  - Establish a toileting routine/schedule  - Consider collaborating with pharmacy to review patient's medication profile  Outcome: Progressing  Goal: Maintains adequate nutritional intake (undernourished)  Description: INTERVENTIONS:  - Monitor percentage of each meal consumed  - Identify factors contributing to decreased intake, treat as appropriate  - Assist with meals as needed  - Monitor I&O, WT and lab values  - Obtain nutritional consult as needed  - Optimize oral hygiene and moisture  - Encourage food from home; allow for food preferences  - Enhance eating environment  Outcome: Progressing     Problem: SKIN/TISSUE INTEGRITY - ADULT  Goal: Skin integrity remains intact  Description: INTERVENTIONS  - Assess and document risk factors for pressure ulcer development  - Assess and document skin integrity  - Monitor for areas of redness and/or skin breakdown  - Initiate interventions, skin care algorithm/standards of care as needed  Outcome: Progressing     Problem: METABOLIC/FLUID AND ELECTROLYTES - ADULT  Goal: Glucose maintained within prescribed range  Description: INTERVENTIONS:  - Monitor Blood  Glucose as ordered  - Assess for signs and symptoms of hyperglycemia and hypoglycemia  - Administer ordered medications to maintain glucose within target range  - Assess barriers to adequate nutritional intake and initiate nutrition consult as needed  - Instruct patient on self management of diabetes  Outcome: Progressing     Problem: HEMATOLOGIC - ADULT  Goal: Maintains hematologic stability  Description: INTERVENTIONS  - Assess for signs and symptoms of bleeding or hemorrhage  - Monitor labs and vital signs for trends  - Administer supportive blood products/factors, fluids and medications as ordered and appropriate  - Administer supportive blood products/factors as ordered and appropriate  Outcome: Progressing

## 2024-10-23 NOTE — PLAN OF CARE
Pt chief complaint upon admission: CP & CHF exacerbation. Received pt at handoff 1900. Pt in bed, granddaughter at bedside. A&Ox 4. 3L NC overnight, some sleep apnea expected. Rhythm NSR on tele. GI/, (-) c.diff. Some complaint of neck pain. All medications given as ordered. Pt resting in bed w/ all safety measures in place and call light within reach.     Care completed over shift: Tylenol given for neck and back pain, which is chronic. PIV infiltrated, heat pack applied and elevated RUE. Pharmacy called and inquired about special directions as cefepime was infusing. No special care required.     Plan is to eventually switch to PO abx. Pt eager to discharge, as pt has ortho appt later today. Will pass on to day RN to coordinate DC asap or work with SW to discuss solution.    Problem: Diabetes/Glucose Control  Goal: Glucose maintained within prescribed range  Description: INTERVENTIONS:  - Monitor Blood Glucose as ordered  - Assess for signs and symptoms of hyperglycemia and hypoglycemia  - Administer ordered medications to maintain glucose within target range  - Assess barriers to adequate nutritional intake and initiate nutrition consult as needed  - Instruct patient on self management of diabetes  Outcome: Progressing     Problem: Patient/Family Goals  Goal: Patient/Family Long Term Goal  Description: Patient's Long Term Goal: stay healthy    Interventions:  - HD  -Medication management  -Dietary management  -  - See additional Care Plan goals for specific interventions  Outcome: Progressing  Goal: Patient/Family Short Term Goal  Description: Patient's Short Term Goal: discharge home    Interventions:   - HD today  -Anti anxiety medication  -Emotional support  -Wean O2  - See additional Care Plan goals for specific interventions  Outcome: Progressing     Problem: PAIN - ADULT  Goal: Verbalizes/displays adequate comfort level or patient's stated pain goal  Description: INTERVENTIONS:  - Encourage pt to monitor  pain and request assistance  - Assess pain using appropriate pain scale  - Administer analgesics based on type and severity of pain and evaluate response  - Implement non-pharmacological measures as appropriate and evaluate response  - Consider cultural and social influences on pain and pain management  - Manage/alleviate anxiety  - Utilize distraction and/or relaxation techniques  - Monitor for opioid side effects  - Notify MD/LIP if interventions unsuccessful or patient reports new pain  - Anticipate increased pain with activity and pre-medicate as appropriate  Outcome: Progressing     Problem: RISK FOR INFECTION - ADULT  Goal: Absence of fever/infection during anticipated neutropenic period  Description: INTERVENTIONS  - Monitor WBC  - Administer growth factors as ordered  - Implement neutropenic guidelines  Outcome: Progressing     Problem: SAFETY ADULT - FALL  Goal: Free from fall injury  Description: INTERVENTIONS:  - Assess pt frequently for physical needs  - Identify cognitive and physical deficits and behaviors that affect risk of falls.  - Devol fall precautions as indicated by assessment.  - Educate pt/family on patient safety including physical limitations  - Instruct pt to call for assistance with activity based on assessment  - Modify environment to reduce risk of injury  - Provide assistive devices as appropriate  - Consider OT/PT consult to assist with strengthening/mobility  - Encourage toileting schedule  Outcome: Progressing     Problem: DISCHARGE PLANNING  Goal: Discharge to home or other facility with appropriate resources  Description: INTERVENTIONS:  - Identify barriers to discharge w/pt and caregiver  - Include patient/family/discharge partner in discharge planning  - Arrange for needed discharge resources and transportation as appropriate  - Identify discharge learning needs (meds, wound care, etc)  - Arrange for interpreters to assist at discharge as needed  - Consider post-discharge  preferences of patient/family/discharge partner  - Complete POLST form as appropriate  - Assess patient's ability to be responsible for managing their own health  - Refer to Case Management Department for coordinating discharge planning if the patient needs post-hospital services based on physician/LIP order or complex needs related to functional status, cognitive ability or social support system  Outcome: Progressing     Problem: CARDIOVASCULAR - ADULT  Goal: Maintains optimal cardiac output and hemodynamic stability  Description: INTERVENTIONS:  - Monitor vital signs, rhythm, and trends  - Monitor for bleeding, hypotension and signs of decreased cardiac output  - Evaluate effectiveness of vasoactive medications to optimize hemodynamic stability  - Monitor arterial and/or venous puncture sites for bleeding and/or hematoma  - Assess quality of pulses, skin color and temperature  - Assess for signs of decreased coronary artery perfusion - ex. Angina  - Evaluate fluid balance, assess for edema, trend weights  Outcome: Progressing  Goal: Absence of cardiac arrhythmias or at baseline  Description: INTERVENTIONS:  - Continuous cardiac monitoring, monitor vital signs, obtain 12 lead EKG if indicated  - Evaluate effectiveness of antiarrhythmic and heart rate control medications as ordered  - Initiate emergency measures for life threatening arrhythmias  - Monitor electrolytes and administer replacement therapy as ordered  Outcome: Progressing     Problem: RESPIRATORY - ADULT  Goal: Achieves optimal ventilation and oxygenation  Description: INTERVENTIONS:  - Assess for changes in respiratory status  - Assess for changes in mentation and behavior  - Position to facilitate oxygenation and minimize respiratory effort  - Oxygen supplementation based on oxygen saturation or ABGs  - Provide Smoking Cessation handout, if applicable  - Encourage broncho-pulmonary hygiene including cough, deep breathe, Incentive Spirometry  - Assess  the need for suctioning and perform as needed  - Assess and instruct to report SOB or any respiratory difficulty  - Respiratory Therapy support as indicated  - Manage/alleviate anxiety  - Monitor for signs/symptoms of CO2 retention  Outcome: Progressing     Problem: GASTROINTESTINAL - ADULT  Goal: Minimal or absence of nausea and vomiting  Description: INTERVENTIONS:  - Maintain adequate hydration with IV or PO as ordered and tolerated  - Nasogastric tube to low intermittent suction as ordered  - Evaluate effectiveness of ordered antiemetic medications  - Provide nonpharmacologic comfort measures as appropriate  - Advance diet as tolerated, if ordered  - Obtain nutritional consult as needed  - Evaluate fluid balance  Outcome: Progressing  Goal: Maintains or returns to baseline bowel function  Description: INTERVENTIONS:  - Assess bowel function  - Maintain adequate hydration with IV or PO as ordered and tolerated  - Evaluate effectiveness of GI medications  - Encourage mobilization and activity  - Obtain nutritional consult as needed  - Establish a toileting routine/schedule  - Consider collaborating with pharmacy to review patient's medication profile  Outcome: Progressing  Goal: Maintains adequate nutritional intake (undernourished)  Description: INTERVENTIONS:  - Monitor percentage of each meal consumed  - Identify factors contributing to decreased intake, treat as appropriate  - Assist with meals as needed  - Monitor I&O, WT and lab values  - Obtain nutritional consult as needed  - Optimize oral hygiene and moisture  - Encourage food from home; allow for food preferences  - Enhance eating environment  Outcome: Progressing     Problem: METABOLIC/FLUID AND ELECTROLYTES - ADULT  Goal: Glucose maintained within prescribed range  Description: INTERVENTIONS:  - Monitor Blood Glucose as ordered  - Assess for signs and symptoms of hyperglycemia and hypoglycemia  - Administer ordered medications to maintain glucose  within target range  - Assess barriers to adequate nutritional intake and initiate nutrition consult as needed  - Instruct patient on self management of diabetes  Outcome: Progressing     Problem: SKIN/TISSUE INTEGRITY - ADULT  Goal: Skin integrity remains intact  Description: INTERVENTIONS  - Assess and document risk factors for pressure ulcer development  - Assess and document skin integrity  - Monitor for areas of redness and/or skin breakdown  - Initiate interventions, skin care algorithm/standards of care as needed  Outcome: Progressing     Problem: HEMATOLOGIC - ADULT  Goal: Maintains hematologic stability  Description: INTERVENTIONS  - Assess for signs and symptoms of bleeding or hemorrhage  - Monitor labs and vital signs for trends  - Administer supportive blood products/factors, fluids and medications as ordered and appropriate  - Administer supportive blood products/factors as ordered and appropriate  Outcome: Progressing

## 2024-10-23 NOTE — PROGRESS NOTES
Per daughter and pt, pt has been taking IV abx with her scheduled dialysis 3x a week. Javier, daughter, explained that while inpatient at [facility], pt was taking IV abx once daily but then was discharged to take abx 3x a week when having dialysis.     Pt has appt with Dr. Medellin today at 10:15 AM and would like to be discharged as soon as possible in order to make it. Will pass on to day RN.

## 2024-10-23 NOTE — PROGRESS NOTES
[facility]   part of [facility]     Hospitalist Progress Note     Merlin George Patient Status:  Observation    10/27/1965 MRN [id number]   Location [facility] 8NE-A Attending Alberto Anglin MD   Hosp Day # 0 PCP None Pcp     Subjective:   Feels good     Objective:    Review of Systems:   A comprehensive review of systems was completed; pertinent positive and negatives stated in subjective.  Vital signs:  Temp:  [96.8 °F (36 °C)-98.4 °F (36.9 °C)] 97.9 °F (36.6 °C)  Pulse:  [] 84  Resp:  [19-21] 20  BP: (137-176)/(77-88) 158/84  SpO2:  [93 %-100 %] 100 %  Physical Exam:    General: No acute distress    Respiratory: no wheezes, no rhonchi  Cardiovascular: S1, S2, RRR  Abdomen: Soft, NT/ND, +BS  Extremities: no edema    Diagnostic Data:    Labs:  Recent Labs   Lab 10/21/24  0330 10/22/24  0512   WBC 5.6 4.2   HGB 10.4* 9.5*   MCV 89.2 91.0   .0 207.0     Recent Labs   Lab 10/21/24  0331 10/22/24  0512   * 99   BUN 27* 15   CREATSERUM 4.56* 3.63*   CA 10.2 10.3   ALB 4.0  --     137   K 3.8 4.0   CL 98 101   CO2 31.0 26.0   ALKPHO 183*  --    AST 24  --    ALT <7*  --    BILT 0.2*  --    TP 7.6  --      Estimated Creatinine Clearance: 13 mL/min (A) (based on SCr of 3.63 mg/dL (H)).  No results for input(s): \"PTP\", \"INR\" in the last 168 hours.     Microbiology  No results found for this visit on 10/21/24.  Imaging: Reviewed in Epic.  Medications:    aspirin  81 mg Oral Daily    atorvastatin  40 mg Oral Nightly    carvedilol  25 mg Oral BID with meals    cinacalcet  30 mg Oral Daily with breakfast    linaCLOtide  145 mcg Oral Daily    losartan  50 mg Oral Daily    methocarbamol  750 mg Oral TID    NIFEdipine ER  60 mg Oral BID    pantoprazole  40 mg Oral QAM AC    sevelamer carbonate  1,600 mg Oral TID CC    heparin  5,000 Units Subcutaneous Q8H DAVID    ipratropium  2 puff Inhalation QID    cefepime  1 g Intravenous Q24H     Assessment & Plan:    #Chest pain  - ACS ruled out   -  Echo in the am  - Cardiology on consult    #Anxiety/Panic attacks- Xanax PRN     #Acute pulmonary edema- HD per renal again today      # ESRD on HD- per renal     #Recent paraspinal abscess- IV Cefepime 8 weeks    #C4 burst fracture with cord compression- multiple surgeries recommended but on hold 2/2 infection- f/u as outpt     #Thoracic disc herniation s/p laminectomy- pain control     # Hypertension- coreg, hydralazine, nifedepine and losartan.     # HFpEF- Echo in 2023 show EF of 55% with grade 1 diastolic dysfunction.     # Hyperlipidemia- statin      # GERD- PPI    DC today          Supplementary Documentation:   Quality:  DVT Mechanical Prophylaxis:   SCDs,    DVT Pharmacologic Prophylaxis   Medication    heparin (Porcine) 5000 UNIT/ML injection 5,000 Units         DVT Pharmacologic prophylaxis: Aspirin 81 mg      Code Status: Not on file  Mitchell: No urinary catheter in place  Mitchell Duration (in days):   Central line:    CHELY: 10/23/2024  At this point Ms. Simmons is expected to be discharge to: home     The 21st Century Cures Act makes medical notes like these available to patients in the interest of transparency. Please be advised this is a medical document. Medical documents are intended to carry relevant information, facts as evident, and the clinical opinion of the practitioner. The medical note is intended as peer to peer communication and may appear blunt or direct. It is written in medical language and may contain abbreviations or verbiage that are unfamiliar.

## 2024-10-24 ENCOUNTER — PATIENT OUTREACH (OUTPATIENT)
Dept: CASE MANAGEMENT | Age: 59
End: 2024-10-24

## 2024-10-24 NOTE — DISCHARGE SUMMARY
[facility]     Merlin George Patient Status:  Observation    10/27/1965 MRN [id number]   Location [facility] 8NE-A Attending No att. providers found   Hosp Day # 0 PCP None Pcp     Date of Admission: 10/21/2024  Date of Discharge: 10/23/2024    Discharge Disposition: Home or Self Care    Admitting Diagnosis:   Chest pain, rule out acute myocardial infarction [R07.9]    Hospital Discharge Diagnoses:  #Chest pain  - ACS ruled out   - Echo per Cardiology   - Cardiology on consult     #Anxiety/Panic attacks- Xanax PRN      #Acute pulmonary edema- HD per renal again prior to DC      # ESRD on HD- per renal      #Recent paraspinal abscess- IV Cefepime 8 weeks     #C4 burst fracture with cord compression- multiple surgeries recommended but on hold 2/2 infection- f/u as outpt      #Thoracic disc herniation s/p laminectomy- pain control     # Hypertension- coreg, hydralazine, nifedepine and losartan.     # HFpEF- Echo in  show EF of 55% with grade 1 diastolic dysfunction.     # Hyperlipidemia- statin      # GERD- PPI    Lace+ Score: 51  59-90 High Risk  29-58 Medium Risk  0-28   Low Risk.     Brief Synopsis: Patient was ruled out for acute coronary syndrome.  Cardiology and nephrology were on consultation.  They signed off and cleared the patient for discharge.  Patient underwent multiple dialysis treatments for the pulmonary edema which improved.  No further workup.  Patient given medication for anxiety and discussed with family as well.  They are in agreement and patient was discharged in stable condition.    Procedures during hospitalization:   None    Lab/Test results pending at Discharge:   None    Consultants:  Cardiology, nephrology    Discharge Medication List:     Discharge Medications        START taking these medications        Instructions Prescription details   ALPRAZolam 0.25 MG Tabs  Commonly known as: Xanax      Take 1 tablet (0.25 mg total) by mouth 3 (three) times daily  as needed for Anxiety.   Quantity: 30 tablet  Refills: 0            CHANGE how you take these medications        Instructions Prescription details   atorvastatin 80 MG Tabs  Commonly known as: Lipitor  What changed:   medication strength  how much to take      Take 1 tablet (80 mg total) by mouth nightly.   Quantity: 30 tablet  Refills: 3            CONTINUE taking these medications        Instructions Prescription details   aspirin 81 MG Tbec      Take 1 tablet (81 mg total) by mouth daily.   Refills: 0     carvedilol 25 MG Tabs  Commonly known as: Coreg      Take 1 tablet (25 mg total) by mouth 2 (two) times daily with meals.   Refills: 0     ceFEPIme 100 mg/mL in sodium chloride      10 mL (1 g total) by IV Push route one time. During Dialyis, T, TH, Saturday   Refills: 0     cinacalcet 30 MG Tabs  Commonly known as: Sensipar      Take 1 tablet (30 mg total) by mouth daily with breakfast.   Refills: 0     docusate sodium 100 MG Caps  Commonly known as: Colace      Take 1 capsule (100 mg total) by mouth 2 (two) times daily as needed for constipation.   Refills: 0     folic acid 1 MG Tabs  Commonly known as: Folvite      Take 1 tablet (1 mg total) by mouth daily.   Refills: 0     linaCLOtide 145 MCG Caps      Take 145 mcg by mouth daily.   Refills: 0     losartan 50 MG Tabs  Commonly known as: Cozaar      Take 1 tablet (50 mg total) by mouth daily.   Refills: 0     methocarbamol 750 MG Tabs  Commonly known as: Robaxin      Take 1 tablet (750 mg total) by mouth 3 (three) times daily.   Refills: 0     Methylnaltrexone Bromide 150 MG Tabs      Take 150 mg by mouth daily.   Refills: 0     NIFEdipine ER 60 MG Tb24  Commonly known as: ADALAT CC      Take 1 tablet (60 mg total) by mouth in the morning and 1 tablet (60 mg total) before bedtime.   Refills: 0     pantoprazole 40 MG Tbec  Commonly known as: Protonix      Take 1 tablet (40 mg total) by mouth every morning before breakfast.   Refills: 0     Renvela 800 MG  Tabs  Generic drug: sevelamer carbonate      Take 1 tablet (800 mg total) by mouth 3 (three) times daily with meals. 2 tabs   Refills: 0     sennosides 8.6 MG Tabs  Commonly known as: Senokot      Take 2 tablets (17.2 mg total) by mouth as needed for constipation.   Refills: 0     Vitamin B Complex Caps      Take by mouth.   Refills: 0     zinc oxide 20 % Oint  Commonly known as: zinc oxide      Apply topically as needed for Dry Skin.   Refills: 0               Where to Get Your Medications        Please  your prescriptions at the location directed by your doctor or nurse    Bring a paper prescription for each of these medications  ALPRAZolam 0.25 MG Tabs  atorvastatin 80 MG Tabs         Follow-up appointment:   Claritza Cuevas MD  [facility]  [address]  [place] [address]  [phone]    Schedule an appointment as soon as possible for a visit in 1 week(s)      Jhonny Blake MD  [address] [place] [address]  4TH FLOOR  [facility] [address]  [phone]    Follow up  Our office will call you in 1-2 days to schedule you for a stress test and follow up appointment with Dr Blake    [facility]  [address]  [facility] [address] [phone]  Follow up        -----------------------------------------------------------------------------------------------  PATIENT DISCHARGE INSTRUCTIONS: See electronic chart    Alberto Anglni MD 10/24/2024    Time spent: 33 minutes

## 2024-10-24 NOTE — PROGRESS NOTES
NCM attempted to contact the patient for HFU. A man answered and stated that she is currently at dialysis. JESUSITA will try again another time.

## 2024-10-25 NOTE — PROGRESS NOTES
Attempted to contact pt for condition update however no answer. Call rang once and then disconnected. Unable to lave a VM at this time. NCM to try again at a later time.

## 2024-10-28 ENCOUNTER — PATIENT OUTREACH (OUTPATIENT)
Dept: CASE MANAGEMENT | Age: 59
End: 2024-10-28

## 2024-10-28 NOTE — PROGRESS NOTES
Called pt to schedule a [facility] hospital follow-up appt :      [facility] Request :    [facility]  [address] [place] [id number] [phone]  Follow up    DECLINED - Spoke with pt brother who informed that [place] and will be retuning back home on this upcoming Kush 11/3       Closing encounter         DISPLAY PLAN FREE TEXT

## 2024-11-07 NOTE — PROGRESS NOTES
Multiple attempts to reach the pt with no returned phone call. Past recommended HFU timeframe, closing encounter.